# Patient Record
Sex: MALE | Race: BLACK OR AFRICAN AMERICAN | Employment: OTHER | URBAN - METROPOLITAN AREA
[De-identification: names, ages, dates, MRNs, and addresses within clinical notes are randomized per-mention and may not be internally consistent; named-entity substitution may affect disease eponyms.]

---

## 2021-04-05 ENCOUNTER — OFFICE VISIT (OUTPATIENT)
Dept: SURGERY | Facility: CLINIC | Age: 61
End: 2021-04-05
Payer: COMMERCIAL

## 2021-04-05 VITALS
WEIGHT: 213 LBS | SYSTOLIC BLOOD PRESSURE: 145 MMHG | HEIGHT: 75 IN | DIASTOLIC BLOOD PRESSURE: 71 MMHG | HEART RATE: 53 BPM | BODY MASS INDEX: 26.49 KG/M2

## 2021-04-05 DIAGNOSIS — N13.8 BPH WITH OBSTRUCTION/LOWER URINARY TRACT SYMPTOMS: Primary | ICD-10-CM

## 2021-04-05 DIAGNOSIS — Z12.5 PROSTATE CANCER SCREENING: ICD-10-CM

## 2021-04-05 DIAGNOSIS — N52.9 ERECTILE DYSFUNCTION, UNSPECIFIED ERECTILE DYSFUNCTION TYPE: ICD-10-CM

## 2021-04-05 DIAGNOSIS — N40.1 BPH WITH OBSTRUCTION/LOWER URINARY TRACT SYMPTOMS: Primary | ICD-10-CM

## 2021-04-05 PROCEDURE — 3008F BODY MASS INDEX DOCD: CPT | Performed by: UROLOGY

## 2021-04-05 PROCEDURE — 3077F SYST BP >= 140 MM HG: CPT | Performed by: UROLOGY

## 2021-04-05 PROCEDURE — 3078F DIAST BP <80 MM HG: CPT | Performed by: UROLOGY

## 2021-04-05 PROCEDURE — 99204 OFFICE O/P NEW MOD 45 MIN: CPT | Performed by: UROLOGY

## 2021-04-05 RX ORDER — RUFINAMIDE 40 MG/ML
1 SUSPENSION ORAL DAILY
COMMUNITY

## 2021-04-05 RX ORDER — LOSARTAN POTASSIUM 50 MG/1
50 TABLET ORAL DAILY
COMMUNITY

## 2021-04-05 RX ORDER — AMLODIPINE BESYLATE 10 MG/1
10 TABLET ORAL DAILY
COMMUNITY

## 2021-04-05 NOTE — PROGRESS NOTES
[address] [place] [address] Urology  Initial Office Consultation    HPI:   Vineet Hsu is a 62year old male here today for consultation at the request of, and a copy of this note will be sent to, CHERRIE, [address] [facility].    1. BPH with LUTS  2.  Erectile Dysfunction  Longstanding SYSTEMS:  Pertinent positives and negatives per HPI. A 10-point ROS was performed and is otherwise negative.        EXAM:  /71 (BP Location: Right arm, Patient Position: Sitting, Cuff Size: adult)   Pulse 53   Ht 6' 3\" (1.905 m)   Wt 213 lb (96.6 kg) Discussed limiting fluids within 4 hours of bedtime. All questions answered. PLAN:  1. Continue tadalafil 5 mg daily for BPH and ED. Counseled on compliance with the medication. Patient has medication prescribed by his PCP.     2.  Continue prosta

## 2021-10-15 ENCOUNTER — TELEPHONE (OUTPATIENT)
Dept: SURGERY | Facility: CLINIC | Age: 61
End: 2021-10-15

## 2021-10-15 RX ORDER — TADALAFIL 5 MG/1
0.5 TABLET ORAL
COMMUNITY
End: 2021-10-15

## 2021-10-15 NOTE — TELEPHONE ENCOUNTER
Per pt requesting a prescription for daily cyalis. Per pt he was getting the prescription from his previous urologist and will be out at the end of this month.  Please advise

## 2021-10-15 NOTE — TELEPHONE ENCOUNTER
Called pt verified name/ informed pt received message and will sent to Dr. Tricia Mcdonald to see if he will start refilling pt daily tadalafil 5mg instead of the PCP. Copied and pasted part of [facility] note below. PLAN:  1.   Continue tadalafil 5 mg daily for BPH and E

## 2021-10-20 RX ORDER — TADALAFIL 5 MG/1
5 TABLET ORAL DAILY
Qty: 90 TABLET | Refills: 3 | Status: SHIPPED | OUTPATIENT
Start: 2021-10-20 | End: 2021-11-15

## 2021-11-15 RX ORDER — TADALAFIL 5 MG/1
5 TABLET ORAL DAILY
Qty: 90 TABLET | Refills: 3 | Status: SHIPPED | OUTPATIENT
Start: 2021-11-15

## 2021-11-15 NOTE — TELEPHONE ENCOUNTER
I s/w pt and informed him that the Cialis 5 mg requires PA, most likely because his ins.  Plan no longer covers it or because it is not on the preferred formulary or because he hasnt tried other tier 1 meds first. I told pt that I will try calling [facility]

## 2021-11-19 NOTE — TELEPHONE ENCOUNTER
Patient calling back to follow up with some information he received form [employer]. Please advise   New fax number for prior authorization department.    Fax:[phone]

## 2021-11-22 NOTE — TELEPHONE ENCOUNTER
I printed the fax form for the Tier exception and filled it out and had [facility] sign it and I faxed it back to [address] [place] [address]. Will wait for response.

## 2021-11-23 NOTE — TELEPHONE ENCOUNTER
Received call from Miguelito Payne from [employer]. States he had some additional questions in regards to patients Cialis, and diagnosis codes. Provided information from patients LOV note from 4/5/2021 below, and provided dx codes.  Per Miguelito Payne medication will most

## 2021-11-24 NOTE — TELEPHONE ENCOUNTER
Received approval from [facility],RKFOR cIALIS 2.5 MG -5 MG  FROM 11/23//21 THRU 11/23/2024

## 2022-01-27 ENCOUNTER — OFFICE VISIT (OUTPATIENT)
Dept: PHYSICAL MEDICINE AND REHAB | Facility: CLINIC | Age: 62
End: 2022-01-27
Payer: COMMERCIAL

## 2022-01-27 ENCOUNTER — HOSPITAL ENCOUNTER (OUTPATIENT)
Dept: GENERAL RADIOLOGY | Facility: HOSPITAL | Age: 62
Discharge: HOME | End: 2022-01-27
Attending: PHYSICAL MEDICINE & REHABILITATION
Payer: COMMERCIAL

## 2022-01-27 VITALS
DIASTOLIC BLOOD PRESSURE: 86 MMHG | HEIGHT: 74 IN | HEART RATE: 55 BPM | SYSTOLIC BLOOD PRESSURE: 130 MMHG | WEIGHT: 206 LBS | OXYGEN SATURATION: 97 % | BODY MASS INDEX: 26.44 KG/M2

## 2022-01-27 DIAGNOSIS — M51.26 HNP (HERNIATED NUCLEUS PULPOSUS), LUMBAR: Primary | ICD-10-CM

## 2022-01-27 DIAGNOSIS — M51.26 HNP (HERNIATED NUCLEUS PULPOSUS), LUMBAR: ICD-10-CM

## 2022-01-27 PROCEDURE — 3008F BODY MASS INDEX DOCD: CPT | Performed by: PHYSICAL MEDICINE & REHABILITATION

## 2022-01-27 PROCEDURE — 3079F DIAST BP 80-89 MM HG: CPT | Performed by: PHYSICAL MEDICINE & REHABILITATION

## 2022-01-27 PROCEDURE — 99204 OFFICE O/P NEW MOD 45 MIN: CPT | Performed by: PHYSICAL MEDICINE & REHABILITATION

## 2022-01-27 PROCEDURE — 72114 X-RAY EXAM L-S SPINE BENDING: CPT | Performed by: PHYSICAL MEDICINE & REHABILITATION

## 2022-01-27 PROCEDURE — 3075F SYST BP GE 130 - 139MM HG: CPT | Performed by: PHYSICAL MEDICINE & REHABILITATION

## 2022-01-27 RX ORDER — METHYLPREDNISOLONE 4 MG/1
TABLET ORAL
Qty: 1 EACH | Refills: 0 | Status: SHIPPED | OUTPATIENT
Start: 2022-01-27

## 2022-01-27 NOTE — PATIENT INSTRUCTIONS
-Start physical therapy and home exercises  -Medrol dose pack to be started today  -Ice/Heat as tolerated  -Xray on the way out today  -Please stop the medication if you have any side effects and call the office if you have any questions or concerns  -If n

## 2022-01-28 ENCOUNTER — TELEPHONE (OUTPATIENT)
Dept: PHYSICAL THERAPY | Facility: HOSPITAL | Age: 62
End: 2022-01-28

## 2022-01-28 ENCOUNTER — OFFICE VISIT (OUTPATIENT)
Dept: PHYSICAL THERAPY | Facility: HOSPITAL | Age: 62
End: 2022-01-28
Attending: PHYSICAL MEDICINE & REHABILITATION
Payer: COMMERCIAL

## 2022-01-28 DIAGNOSIS — M51.26 HNP (HERNIATED NUCLEUS PULPOSUS), LUMBAR: ICD-10-CM

## 2022-01-28 PROCEDURE — 97161 PT EVAL LOW COMPLEX 20 MIN: CPT

## 2022-01-28 PROCEDURE — 97110 THERAPEUTIC EXERCISES: CPT

## 2022-01-28 NOTE — PROGRESS NOTES
LUMBAR SPINE EVALUATION:   Mango Tejeda    5/20/1960  Referring Physician:  Marilyn aMrroquin  Diagnosis: HNP (herniated nucleus pulposus), lumbar (M51.26)    Initial Evaluation Date: 1/28/2022  Through date: 4/28/2022   Precautions/Hx: HLD, HTN, enlarged pro the following limitations: mild gait deviation; LLD L>R; postural deviation; body mechanics; lumbopelvic control; lumbar ROM; hip ROM; L LE strength; and B LE flexibility. Pt and PT discussed evaluation findings, pathology, POC and HEP.   Pt voiced under fair       Sensation 1/28/2022   Dermatomes Light Touch    Proximal medial thigh R (L2)  wnl   Proximal medial thigh L (L2) wnl   Above knee R (L3) wnl   Above knee L (L3) wnl   Medial arch R(L4) wnl   Medial arch L (L4) wnl   Between 1st – 2nd toes R (L5) posterior facet joints at L5-S1 bilaterally. FLEXION/EXTENSION VIEWS: Normal range of motion.  No subluxation during flexion and extension.     OTHER: Negative.                     Impression   CONCLUSION:   1.  Minimal upper lumbar levoscoliosis. [facility] needed (ice/heat), postural corrections and the importance of staying active.     Charges: PT Eval 1, TE    Total Timed treatment: 20 min      Total Treatment Time: 55 min    Thank you for your referral. Please co-sign or sign and return this letter via fax

## 2022-01-31 ENCOUNTER — OFFICE VISIT (OUTPATIENT)
Dept: PHYSICAL THERAPY | Facility: HOSPITAL | Age: 62
End: 2022-01-31
Attending: PHYSICAL MEDICINE & REHABILITATION
Payer: COMMERCIAL

## 2022-01-31 PROBLEM — M51.26 HNP (HERNIATED NUCLEUS PULPOSUS), LUMBAR: Status: ACTIVE | Noted: 2022-01-31

## 2022-01-31 PROCEDURE — 97112 NEUROMUSCULAR REEDUCATION: CPT

## 2022-01-31 NOTE — PROGRESS NOTES
[address]  NEW PATIENT EVALUATION    Consultation as a request of Dr. Makayla Serrano    Chief Complaint: back pain.     HISTORY OF PRESENT ILLNESS:   Patient presents with:  Low Back Pain: New right handed patient co SOCIAL HISTORY:   Social History    Tobacco Use      Smoking status: Never Smoker      Smokeless tobacco: Never Used    Alcohol use: Not Currently    Drug use: Not Currently         REVIEW OF SYSTEMS:   Patient-reported ROS  Constitutional  Sleep Dis No scoliosis or kyphosis    Musculoskeletal/Neurological Exam:    LUMBAR SPINE:  Inspection: no erythema, swelling, or obvious deformity.   Their iliac crest and shoulder heights are symmetrical.     Palpation: Non tender to palpation of the spinous process was in agreement. All questions/concerns were addressed and there were no barriers to learning. David JUAREZ. [facility]  Physical Medicine and Rehabilitation/Sports Medicine

## 2022-02-03 ENCOUNTER — OFFICE VISIT (OUTPATIENT)
Dept: PHYSICAL THERAPY | Facility: HOSPITAL | Age: 62
End: 2022-02-03
Attending: PHYSICAL MEDICINE & REHABILITATION
Payer: COMMERCIAL

## 2022-02-03 PROCEDURE — 97112 NEUROMUSCULAR REEDUCATION: CPT

## 2022-02-03 PROCEDURE — 97110 THERAPEUTIC EXERCISES: CPT

## 2022-02-07 ENCOUNTER — OFFICE VISIT (OUTPATIENT)
Dept: PHYSICAL THERAPY | Facility: HOSPITAL | Age: 62
End: 2022-02-07
Attending: PHYSICAL MEDICINE & REHABILITATION
Payer: COMMERCIAL

## 2022-02-07 PROCEDURE — 97110 THERAPEUTIC EXERCISES: CPT

## 2022-02-07 PROCEDURE — 97112 NEUROMUSCULAR REEDUCATION: CPT

## 2022-02-11 ENCOUNTER — OFFICE VISIT (OUTPATIENT)
Dept: PHYSICAL THERAPY | Facility: HOSPITAL | Age: 62
End: 2022-02-11
Attending: PHYSICAL MEDICINE & REHABILITATION
Payer: COMMERCIAL

## 2022-02-11 PROCEDURE — 97110 THERAPEUTIC EXERCISES: CPT

## 2022-02-11 PROCEDURE — 97112 NEUROMUSCULAR REEDUCATION: CPT

## 2022-02-14 ENCOUNTER — OFFICE VISIT (OUTPATIENT)
Dept: PHYSICAL THERAPY | Facility: HOSPITAL | Age: 62
End: 2022-02-14
Attending: PHYSICAL MEDICINE & REHABILITATION
Payer: COMMERCIAL

## 2022-02-14 PROCEDURE — 97112 NEUROMUSCULAR REEDUCATION: CPT

## 2022-02-14 PROCEDURE — 97110 THERAPEUTIC EXERCISES: CPT

## 2022-02-17 ENCOUNTER — OFFICE VISIT (OUTPATIENT)
Dept: PHYSICAL THERAPY | Facility: HOSPITAL | Age: 62
End: 2022-02-17
Attending: PHYSICAL MEDICINE & REHABILITATION
Payer: COMMERCIAL

## 2022-02-17 PROCEDURE — 97140 MANUAL THERAPY 1/> REGIONS: CPT

## 2022-02-17 PROCEDURE — 97112 NEUROMUSCULAR REEDUCATION: CPT

## 2022-02-17 PROCEDURE — 97110 THERAPEUTIC EXERCISES: CPT

## 2022-02-18 ENCOUNTER — TELEPHONE (OUTPATIENT)
Dept: PHYSICAL THERAPY | Facility: HOSPITAL | Age: 62
End: 2022-02-18

## 2022-02-18 NOTE — TELEPHONE ENCOUNTER
Called pt in response to his [employer] message. Pt had good initial relief of pain and increased mobility after last tx, but had severe flare up of pain overnight. He notes that he has flare up of pain with sitting and transfers, less with standing/walking and his standing exercises. Discussed frequent performance of extension based exercises including prone. Added another PT appt on Tue 2/22.

## 2022-02-20 ENCOUNTER — APPOINTMENT (OUTPATIENT)
Dept: PHYSICAL THERAPY | Facility: HOSPITAL | Age: 62
End: 2022-02-20
Attending: PHYSICAL MEDICINE & REHABILITATION
Payer: COMMERCIAL

## 2022-02-22 ENCOUNTER — OFFICE VISIT (OUTPATIENT)
Dept: PHYSICAL THERAPY | Facility: HOSPITAL | Age: 62
End: 2022-02-22
Attending: PHYSICAL MEDICINE & REHABILITATION
Payer: COMMERCIAL

## 2022-02-22 PROCEDURE — 97112 NEUROMUSCULAR REEDUCATION: CPT

## 2022-02-22 PROCEDURE — 97110 THERAPEUTIC EXERCISES: CPT

## 2022-02-24 ENCOUNTER — OFFICE VISIT (OUTPATIENT)
Dept: PHYSICAL MEDICINE AND REHAB | Facility: CLINIC | Age: 62
End: 2022-02-24
Payer: COMMERCIAL

## 2022-02-24 ENCOUNTER — APPOINTMENT (OUTPATIENT)
Dept: PHYSICAL THERAPY | Facility: HOSPITAL | Age: 62
End: 2022-02-24
Attending: PHYSICAL MEDICINE & REHABILITATION
Payer: COMMERCIAL

## 2022-02-24 ENCOUNTER — TELEPHONE (OUTPATIENT)
Dept: PHYSICAL MEDICINE AND REHAB | Facility: CLINIC | Age: 62
End: 2022-02-24

## 2022-02-24 ENCOUNTER — TELEPHONE (OUTPATIENT)
Dept: PHYSICAL THERAPY | Facility: HOSPITAL | Age: 62
End: 2022-02-24

## 2022-02-24 VITALS
BODY MASS INDEX: 25.99 KG/M2 | DIASTOLIC BLOOD PRESSURE: 82 MMHG | SYSTOLIC BLOOD PRESSURE: 126 MMHG | WEIGHT: 209 LBS | HEART RATE: 83 BPM | HEIGHT: 75 IN | OXYGEN SATURATION: 96 %

## 2022-02-24 DIAGNOSIS — M79.18 MYOFASCIAL PAIN: ICD-10-CM

## 2022-02-24 DIAGNOSIS — M51.26 HNP (HERNIATED NUCLEUS PULPOSUS), LUMBAR: Primary | ICD-10-CM

## 2022-02-24 PROCEDURE — 99214 OFFICE O/P EST MOD 30 MIN: CPT | Performed by: PHYSICAL MEDICINE & REHABILITATION

## 2022-02-24 PROCEDURE — 3008F BODY MASS INDEX DOCD: CPT | Performed by: PHYSICAL MEDICINE & REHABILITATION

## 2022-02-24 PROCEDURE — 3074F SYST BP LT 130 MM HG: CPT | Performed by: PHYSICAL MEDICINE & REHABILITATION

## 2022-02-24 PROCEDURE — 3079F DIAST BP 80-89 MM HG: CPT | Performed by: PHYSICAL MEDICINE & REHABILITATION

## 2022-02-24 RX ORDER — DIAZEPAM 10 MG/1
TABLET ORAL
Qty: 1 TABLET | Refills: 0 | Status: SHIPPED | OUTPATIENT
Start: 2022-02-24

## 2022-02-24 NOTE — TELEPHONE ENCOUNTER
Called pt to advise of missed appt. Pt states that he thought it had been canceled because he came in on Tu. Remind of next appt on Mon 2/28.

## 2022-02-24 NOTE — TELEPHONE ENCOUNTER
Initiated authorization for L-Spine MRI CPT [id number] with [facility] online  Case [id number].     Status: Approved-  This member's plan does not currently require notification or prior-authorization through the [address] Notification or Prior-Authorization Program    Patient notified via [place]

## 2022-02-27 ENCOUNTER — APPOINTMENT (OUTPATIENT)
Dept: PHYSICAL THERAPY | Facility: HOSPITAL | Age: 62
End: 2022-02-27
Attending: PHYSICAL MEDICINE & REHABILITATION
Payer: COMMERCIAL

## 2022-02-28 ENCOUNTER — TELEPHONE (OUTPATIENT)
Dept: PHYSICAL MEDICINE AND REHAB | Facility: CLINIC | Age: 62
End: 2022-02-28

## 2022-02-28 ENCOUNTER — OFFICE VISIT (OUTPATIENT)
Dept: PHYSICAL THERAPY | Facility: HOSPITAL | Age: 62
End: 2022-02-28
Attending: PHYSICAL MEDICINE & REHABILITATION
Payer: COMMERCIAL

## 2022-02-28 PROBLEM — M79.18 MYOFASCIAL PAIN: Status: ACTIVE | Noted: 2022-02-28

## 2022-02-28 PROCEDURE — 97110 THERAPEUTIC EXERCISES: CPT

## 2022-02-28 PROCEDURE — 97140 MANUAL THERAPY 1/> REGIONS: CPT

## 2022-03-03 ENCOUNTER — APPOINTMENT (OUTPATIENT)
Dept: PHYSICAL THERAPY | Facility: HOSPITAL | Age: 62
End: 2022-03-03
Attending: PHYSICAL MEDICINE & REHABILITATION
Payer: COMMERCIAL

## 2022-03-06 ENCOUNTER — APPOINTMENT (OUTPATIENT)
Dept: PHYSICAL THERAPY | Facility: HOSPITAL | Age: 62
End: 2022-03-06
Attending: PHYSICAL MEDICINE & REHABILITATION
Payer: COMMERCIAL

## 2022-03-08 ENCOUNTER — HOSPITAL ENCOUNTER (OUTPATIENT)
Dept: MRI IMAGING | Age: 62
Discharge: HOME | End: 2022-03-08
Attending: PHYSICAL MEDICINE & REHABILITATION
Payer: COMMERCIAL

## 2022-03-08 DIAGNOSIS — M51.26 HNP (HERNIATED NUCLEUS PULPOSUS), LUMBAR: ICD-10-CM

## 2022-03-08 DIAGNOSIS — M79.18 MYOFASCIAL PAIN: ICD-10-CM

## 2022-03-08 PROCEDURE — 72148 MRI LUMBAR SPINE W/O DYE: CPT | Performed by: PHYSICAL MEDICINE & REHABILITATION

## 2022-03-10 ENCOUNTER — TELEPHONE (OUTPATIENT)
Dept: NEUROLOGY | Facility: CLINIC | Age: 62
End: 2022-03-10

## 2022-03-10 ENCOUNTER — OFFICE VISIT (OUTPATIENT)
Dept: PHYSICAL MEDICINE AND REHAB | Facility: CLINIC | Age: 62
End: 2022-03-10
Payer: COMMERCIAL

## 2022-03-10 VITALS
BODY MASS INDEX: 25.99 KG/M2 | SYSTOLIC BLOOD PRESSURE: 116 MMHG | WEIGHT: 209 LBS | HEIGHT: 75 IN | OXYGEN SATURATION: 98 % | HEART RATE: 55 BPM | DIASTOLIC BLOOD PRESSURE: 72 MMHG

## 2022-03-10 DIAGNOSIS — M54.16 RIGHT LUMBAR RADICULOPATHY: Primary | ICD-10-CM

## 2022-03-10 PROCEDURE — 3074F SYST BP LT 130 MM HG: CPT | Performed by: PHYSICAL MEDICINE & REHABILITATION

## 2022-03-10 PROCEDURE — 99214 OFFICE O/P EST MOD 30 MIN: CPT | Performed by: PHYSICAL MEDICINE & REHABILITATION

## 2022-03-10 PROCEDURE — 3078F DIAST BP <80 MM HG: CPT | Performed by: PHYSICAL MEDICINE & REHABILITATION

## 2022-03-10 PROCEDURE — 3008F BODY MASS INDEX DOCD: CPT | Performed by: PHYSICAL MEDICINE & REHABILITATION

## 2022-03-10 NOTE — TELEPHONE ENCOUNTER
[facility] Online for authorization of approval for Right L3 and L4 Transforaminal Epidural Steroid Injection under fluoroscopy cpt codes 48549-YO, G0638875, [id number]. Tracking #:[id number]. Will fax clinical note when available.

## 2022-03-14 NOTE — TELEPHONE ENCOUNTER
Uploaded clinical note for authorization of approval for Right L3 and L4 Transforaminal Epidural Steroid Injection under fluoroscopy cpt codes 05397-KD, [id number], 29795. Tracking #: [id number]. Fabio Sloan marked case expedite. Reference # [id number].

## 2022-03-14 NOTE — TELEPHONE ENCOUNTER
Patient checking status on procedure. Patient informed still pending. Gentle reminder to send note to insurance.

## 2022-03-14 NOTE — TELEPHONE ENCOUNTER
Received fax from [address] [place] [address] of approval for Right L3 and L4 Transforaminal Epidural Steroid Injection under fluoroscopy cpt codwes 41046-ML, 56423-MD, 51190. Gary Ho Authorization # [id number] valid 03/10/22-06/08/22. Will inform Nursing.

## 2022-03-15 NOTE — TELEPHONE ENCOUNTER
Patient has been scheduled for Right L3 and L4 Transforaminal Epidural Steroid Injection under fluoroscopy  on 3/21/22 at the [facility] with .   -Anesthesia type: Local.  -If receiving MAC or IVC sedation patient will need to get COVID tested 3 days prior even if already vaccinated (order placed by [facility].)  -If scheduling [facility] and [facility] covid testing required for all procedures whether patient is vaccinated or not. -Patient informed not to eat or drink anything after midnight the night prior to the procedure, if being sedated. -Patient was advised that if he/she does receive the covid vaccine it needs to be at least 2 weeks before or after the injection. -Medications and allergies reviewed. -Patient reminded to hold NSAIDs (Ibuprofen, ASA, Aleve, Naproxen, Mobic etc.) for 3 days prior to [facility]  if BMI is greater than 35. For Cervical injections only hold multivitamins, Vitamin E, Fish Oil, Phentermine (Lomaira) for 7 days prior to injection and NSAIDS. -If patient is receiving MAC/IVCS Phentermine Prasad Eureka) will need to be held for 7 days prior to injection.  -If on blood thinner clearance has been received to hold this medication by provider.   -Patient informed he/she will need a  to and from procedure. -[facility] is located in the [facility] 1st floor. Patient may park in the yellow parking. Patient verbalized understanding and agrees with plan.  -----> Scheduled in Epic: Yes  -----> Scheduled in [employer]:  Yes

## 2022-03-18 ENCOUNTER — TELEPHONE (OUTPATIENT)
Dept: PHYSICAL THERAPY | Facility: HOSPITAL | Age: 62
End: 2022-03-18

## 2022-03-18 ENCOUNTER — APPOINTMENT (OUTPATIENT)
Dept: PHYSICAL THERAPY | Facility: HOSPITAL | Age: 62
End: 2022-03-18
Attending: PHYSICAL MEDICINE & REHABILITATION
Payer: COMMERCIAL

## 2022-03-18 NOTE — TELEPHONE ENCOUNTER
Called pt and advised of missed appointment. Pt given time and date for next appt and asked to call dept if unable to attend.

## 2022-03-21 ENCOUNTER — OFFICE VISIT (OUTPATIENT)
Dept: SURGERY | Facility: CLINIC | Age: 62
End: 2022-03-21

## 2022-03-21 DIAGNOSIS — M54.16 RIGHT LUMBAR RADICULOPATHY: Primary | ICD-10-CM

## 2022-03-21 PROCEDURE — 64483 NJX AA&/STRD TFRM EPI L/S 1: CPT | Performed by: PHYSICAL MEDICINE & REHABILITATION

## 2022-03-21 PROCEDURE — 64484 NJX AA&/STRD TFRM EPI L/S EA: CPT | Performed by: PHYSICAL MEDICINE & REHABILITATION

## 2022-03-21 NOTE — PROCEDURES
[facility]    2-LEVEL LUMBAR TRANSFORAMINAL   NAME:  Gerardo Ortiz    MR #:    [id number] :  1960     PHYSICIAN:  Madison Preston DO        Operative Report    DATE OF PROCEDURE: 3/21/2022   PREOPERATIVE DIAGNOSES: 1. Right lumbar radiculopathy        POSTOPERATIVE DIAGNOSES:   1. Right lumbar radiculopathy        PROCEDURES: Right L3 and L4 transforaminal epidural steroid injections done under fluoroscopic guidance with contrast enhancement. SURGEON: Madison Preston DO   ANESTHESIA: Local   INDICATIONS:      OPERATIVE PROCEDURE:  Written consent was obtained from the patient. The patient was brought into the operating room and placed in the prone position on the fluoroscopy table with pillow underneath the abdomen. The patient's skin was cleaned and draped in a normal sterile fashion. Using AP fluoroscopy, all five lumbar vertebrae were identified. When the third and fourth vertebrae were identified, fluoroscopy was left anterior obliqued opening up the right L3-4 and right L4-5 intervertebral foramen. At this point in time, each site was anesthetized with 1% PF lidocaine without epinephrine. Then, 3.5 inch, 22-gauge spinal needles were inserted and directed towards the right L3-4 and right L4-5 intervertebral foramen. When they felt to be in good position, AP fluoroscopy was used to advance the needles to the 6 o'clock position on the right L3 and right L4 pedicles. At this point in time, Omnipaque-240 contrast was used to obtain a good epidurogram indicating correct needle placement at each level. Then, aspiration was performed. No blood, fluid, or air was aspirated. Then, the patient was injected with a 3 cc solution of 1 cc of 10 mg/cc of Dexamethasone and 2 cc of 1% PF lidocaine without epinephrine at each site. After this, the needles were removed. Each site was cleaned. Band-Aids were applied. The patient was transferred to the cart and into [facility].   The patient was given discharge instructions and will follow up in the clinic as scheduled. Throughout the whole procedure, the patient's pulse oximetry and vital signs were monitored and they remained completely stable. Also, throughout the whole procedure, prior to injection of any medication, aspiration was performed. No blood, fluid, or air was aspirated at anytime.     Ivis Boudreaux DO, FAAPMR & CAQSM  Physical Medicine and Rehabilitation/Sports Medicine  MEDICAL C[facility]

## 2022-03-28 ENCOUNTER — OFFICE VISIT (OUTPATIENT)
Dept: PHYSICAL MEDICINE AND REHAB | Facility: CLINIC | Age: 62
End: 2022-03-28
Payer: COMMERCIAL

## 2022-03-28 ENCOUNTER — TELEPHONE (OUTPATIENT)
Dept: PHYSICAL MEDICINE AND REHAB | Facility: CLINIC | Age: 62
End: 2022-03-28

## 2022-03-28 VITALS
OXYGEN SATURATION: 94 % | HEIGHT: 75 IN | BODY MASS INDEX: 26.11 KG/M2 | WEIGHT: 210 LBS | SYSTOLIC BLOOD PRESSURE: 130 MMHG | DIASTOLIC BLOOD PRESSURE: 70 MMHG | HEART RATE: 62 BPM

## 2022-03-28 DIAGNOSIS — M54.16 RIGHT LUMBAR RADICULOPATHY: ICD-10-CM

## 2022-03-28 DIAGNOSIS — M51.26 HNP (HERNIATED NUCLEUS PULPOSUS), LUMBAR: Primary | ICD-10-CM

## 2022-03-28 PROCEDURE — 3075F SYST BP GE 130 - 139MM HG: CPT | Performed by: PHYSICAL MEDICINE & REHABILITATION

## 2022-03-28 PROCEDURE — 99214 OFFICE O/P EST MOD 30 MIN: CPT | Performed by: PHYSICAL MEDICINE & REHABILITATION

## 2022-03-28 PROCEDURE — 3008F BODY MASS INDEX DOCD: CPT | Performed by: PHYSICAL MEDICINE & REHABILITATION

## 2022-03-28 PROCEDURE — 3078F DIAST BP <80 MM HG: CPT | Performed by: PHYSICAL MEDICINE & REHABILITATION

## 2022-03-28 RX ORDER — PREGABALIN 75 MG/1
75 CAPSULE ORAL 2 TIMES DAILY
Qty: 60 CAPSULE | Refills: 0 | Status: SHIPPED | OUTPATIENT
Start: 2022-03-28

## 2022-03-28 NOTE — TELEPHONE ENCOUNTER
Informed Dr. Eloina Robertson in regards of this message.      Patient has a scheduled appointment at 340pm today, 3/28/22

## 2022-03-28 NOTE — TELEPHONE ENCOUNTER
Initiated authorization for Right L3 and L4 Transforaminal Epidural Steroid Injections CPT 57188+[id number] dx:M51.26 to be done at [facility] with Kayley  13. requested clinicals-clinicals will be uploaded once note is completed  Status: pending Tracking #:  [id number]

## 2022-03-28 NOTE — PATIENT INSTRUCTIONS
-My office will call once injection is approved  -Start Lyrica twice daily  -Start PT and home exercises

## 2022-03-28 NOTE — TELEPHONE ENCOUNTER
Pt visit the ED over the weekend : Pt  had loss of vision in his right eye  and dizziness and sweating but only happen in the morning . Pt thinks he had a reaction on the  inj and didn't help with the pain .     Note : Pt has an apt today 03/28/2022

## 2022-03-29 ENCOUNTER — TELEPHONE (OUTPATIENT)
Dept: PHYSICAL THERAPY | Facility: HOSPITAL | Age: 62
End: 2022-03-29

## 2022-03-29 ENCOUNTER — APPOINTMENT (OUTPATIENT)
Dept: PHYSICAL THERAPY | Facility: HOSPITAL | Age: 62
End: 2022-03-29
Attending: PHYSICAL MEDICINE & REHABILITATION
Payer: COMMERCIAL

## 2022-03-29 NOTE — TELEPHONE ENCOUNTER
Received Approval from [facility] for Right L3 and L4 TFESIs with authorization #[id number] valid 3/28/22-6/26/22

## 2022-03-29 NOTE — TELEPHONE ENCOUNTER
Patient has been scheduled for Right L3 and L4 Transforaminal Epidural Steroid Injections  on 4/4/22 at the [address] with .   -Anesthesia type: Local.  -If receiving MAC or IVC sedation patient will need to get COVID tested 3 days prior even if already vaccinated (order placed by [address]  -If scheduling [facility] and [facility] covid testing required for all procedures whether patient is vaccinated or not. -Patient informed not to eat or drink anything after midnight the night prior to the procedure, if being sedated. -Patient was advised that if he/she does receive the covid vaccine it needs to be at least 2 weeks before or after the injection. -Medications and allergies reviewed. -Patient reminded to hold NSAIDs (Ibuprofen, ASA 81, Aleve, Naproxen, Mobic etc.) for 3 days prior to [place]  if BMI is greater than 35. For Cervical injections only hold multivitamins, Vitamin E, Fish Oil, Phentermine (Lomaira) for 7 days prior to injection and NSAIDS.  mg to be held for 7 days prior to injections.  -If patient is receiving MAC/IVCS Phentermine Zohaib Rebel) will need to be held for 7 days prior to injection.  -If on blood thinner clearance has been received to hold this medication by provider.   -Patient informed he/she will need a  to and from procedure. -[facility] is located in the [facility] 1st floor. Patient may park in the yellow parking. Patient verbalized understanding and agrees with plan.  -----> Scheduled in Epic: Yes  -----> Scheduled in Caset[employer]:  Yes

## 2022-04-04 ENCOUNTER — OFFICE VISIT (OUTPATIENT)
Dept: SURGERY | Facility: CLINIC | Age: 62
End: 2022-04-04

## 2022-04-04 ENCOUNTER — APPOINTMENT (OUTPATIENT)
Dept: PHYSICAL THERAPY | Facility: HOSPITAL | Age: 62
End: 2022-04-04
Attending: PHYSICAL MEDICINE & REHABILITATION
Payer: COMMERCIAL

## 2022-04-04 ENCOUNTER — TELEPHONE (OUTPATIENT)
Dept: PHYSICAL THERAPY | Facility: HOSPITAL | Age: 62
End: 2022-04-04

## 2022-04-04 DIAGNOSIS — M54.16 RIGHT LUMBAR RADICULOPATHY: Primary | ICD-10-CM

## 2022-04-04 PROCEDURE — 64484 NJX AA&/STRD TFRM EPI L/S EA: CPT | Performed by: PHYSICAL MEDICINE & REHABILITATION

## 2022-04-04 PROCEDURE — 64483 NJX AA&/STRD TFRM EPI L/S 1: CPT | Performed by: PHYSICAL MEDICINE & REHABILITATION

## 2022-04-04 NOTE — PROCEDURES
[facility]    2-LEVEL LUMBAR TRANSFORAMINAL   NAME:  Nikhil Sanchez    MR #:    [id number] :  1960     PHYSICIAN:  Sarah Schultz. Anneliese Cheema DO        Operative Report    DATE OF PROCEDURE: 2022   PREOPERATIVE DIAGNOSES: 1. Right lumbar radiculopathy        POSTOPERATIVE DIAGNOSES:   1. Right lumbar radiculopathy        PROCEDURES: Right L3 and L4 transforaminal epidural steroid injections done under fluoroscopic guidance with contrast enhancement. SURGEON: Sarah Schultz. Anneliese Cheema DO   ANESTHESIA: Local   INDICATIONS:      OPERATIVE PROCEDURE:  Written consent was obtained from the patient. The patient was brought into the operating room and placed in the prone position on the fluoroscopy table with pillow underneath the abdomen. The patient's skin was cleaned and draped in a normal sterile fashion. Using AP fluoroscopy, all five lumbar vertebrae were identified. When the third and fourth vertebrae were identified, fluoroscopy was left anterior obliqued opening up the right L3-4 and right L4-5 intervertebral foramen. At this point in time, each site was anesthetized with 1% PF lidocaine without epinephrine. Then, 5 inch, 22-gauge spinal needles were inserted and directed towards the right L3-4 and right L4-5 intervertebral foramen. When they felt to be in good position, AP fluoroscopy was used to advance the needles to the 6 o'clock position on the right L3 and right L4 pedicles. At this point in time, Omnipaque-240 contrast was used to obtain a good epidurogram indicating correct needle placement at each level. Then, aspiration was performed. No blood, fluid, or air was aspirated. Then, the patient was injected with a 3 cc solution of 1 cc of  10mg/cc of Dexamethasone and 2 cc of 1% PF lidocaine without epinephrine at each site. After this, the needles were removed. Each site was cleaned. Band-Aids were applied. The patient was transferred to the cart and into [facility].   The patient was given discharge instructions and will follow up in the clinic as scheduled. Throughout the whole procedure, the patient's pulse oximetry and vital signs were monitored and they remained completely stable. Also, throughout the whole procedure, prior to injection of any medication, aspiration was performed. No blood, fluid, or air was aspirated at anytime.     Yusuf Dang DO, FAAPMR & CAQSM  Physical Medicine and Rehabilitation/Sports Medicine  [facility]

## 2022-04-04 NOTE — TELEPHONE ENCOUNTER
Called pt and advised of missed appointment. Pt states that he thought the appt was canceled as he had an SIRISHA this am.  Pt given time and date for next appt and asked to call dept if unable to attend.

## 2022-04-11 ENCOUNTER — TELEPHONE (OUTPATIENT)
Dept: PHYSICAL THERAPY | Facility: HOSPITAL | Age: 62
End: 2022-04-11

## 2022-04-11 ENCOUNTER — APPOINTMENT (OUTPATIENT)
Dept: PHYSICAL THERAPY | Facility: HOSPITAL | Age: 62
End: 2022-04-11
Attending: PHYSICAL MEDICINE & REHABILITATION
Payer: COMMERCIAL

## 2022-04-15 ENCOUNTER — TELEMEDICINE (OUTPATIENT)
Dept: PHYSICAL MEDICINE AND REHAB | Facility: CLINIC | Age: 62
End: 2022-04-15
Payer: COMMERCIAL

## 2022-04-15 DIAGNOSIS — M51.26 HNP (HERNIATED NUCLEUS PULPOSUS), LUMBAR: Primary | ICD-10-CM

## 2022-04-15 DIAGNOSIS — M79.18 MYOFASCIAL PAIN: ICD-10-CM

## 2022-04-15 DIAGNOSIS — M54.16 RIGHT LUMBAR RADICULOPATHY: ICD-10-CM

## 2022-04-15 PROCEDURE — 99213 OFFICE O/P EST LOW 20 MIN: CPT | Performed by: PHYSICAL MEDICINE & REHABILITATION

## 2022-05-03 RX ORDER — PREGABALIN 75 MG/1
CAPSULE ORAL
Qty: 60 CAPSULE | Refills: 2 | Status: SHIPPED | OUTPATIENT
Start: 2022-05-03

## 2022-06-23 ENCOUNTER — OFFICE VISIT (OUTPATIENT)
Dept: SURGERY | Facility: CLINIC | Age: 62
End: 2022-06-23
Payer: COMMERCIAL

## 2022-06-23 DIAGNOSIS — N52.9 ERECTILE DYSFUNCTION, UNSPECIFIED ERECTILE DYSFUNCTION TYPE: ICD-10-CM

## 2022-06-23 DIAGNOSIS — N13.8 BPH WITH OBSTRUCTION/LOWER URINARY TRACT SYMPTOMS: Primary | ICD-10-CM

## 2022-06-23 DIAGNOSIS — Z12.5 PROSTATE CANCER SCREENING: ICD-10-CM

## 2022-06-23 DIAGNOSIS — N40.1 BPH WITH OBSTRUCTION/LOWER URINARY TRACT SYMPTOMS: Primary | ICD-10-CM

## 2022-06-23 PROCEDURE — 99213 OFFICE O/P EST LOW 20 MIN: CPT | Performed by: UROLOGY

## 2022-11-04 ENCOUNTER — LAB ENCOUNTER (OUTPATIENT)
Dept: LAB | Facility: HOSPITAL | Age: 62
End: 2022-11-04
Attending: INTERNAL MEDICINE
Payer: COMMERCIAL

## 2022-11-04 DIAGNOSIS — N40.0 ENLARGED PROSTATE: ICD-10-CM

## 2022-11-04 DIAGNOSIS — I10 ESSENTIAL HYPERTENSION, MALIGNANT: Primary | ICD-10-CM

## 2022-11-04 LAB
ALBUMIN SERPL-MCNC: 3.8 G/DL (ref 3.4–5)
ALBUMIN/GLOB SERPL: 1.1 {RATIO} (ref 1–2)
ALP LIVER SERPL-CCNC: 73 U/L
ALT SERPL-CCNC: 22 U/L
ANION GAP SERPL CALC-SCNC: 8 MMOL/L (ref 0–18)
AST SERPL-CCNC: 16 U/L (ref 15–37)
BASOPHILS # BLD AUTO: 0.02 X10(3) UL (ref 0–0.2)
BASOPHILS NFR BLD AUTO: 0.3 %
BILIRUB SERPL-MCNC: 1.3 MG/DL (ref 0.1–2)
BUN BLD-MCNC: 16 MG/DL (ref 7–18)
BUN/CREAT SERPL: 12.8 (ref 10–20)
CALCIUM BLD-MCNC: 9.1 MG/DL (ref 8.5–10.1)
CHLORIDE SERPL-SCNC: 106 MMOL/L (ref 98–112)
CHOLEST SERPL-MCNC: 151 MG/DL (ref ?–200)
CO2 SERPL-SCNC: 30 MMOL/L (ref 21–32)
CREAT BLD-MCNC: 1.25 MG/DL
DEPRECATED RDW RBC AUTO: 42 FL (ref 35.1–46.3)
EOSINOPHIL # BLD AUTO: 0.01 X10(3) UL (ref 0–0.7)
EOSINOPHIL NFR BLD AUTO: 0.1 %
ERYTHROCYTE [DISTWIDTH] IN BLOOD BY AUTOMATED COUNT: 13.4 % (ref 11–15)
FASTING PATIENT LIPID ANSWER: YES
FASTING STATUS PATIENT QL REPORTED: YES
GFR SERPLBLD BASED ON 1.73 SQ M-ARVRAT: 66 ML/MIN/1.73M2 (ref 60–?)
GLOBULIN PLAS-MCNC: 3.5 G/DL (ref 2.8–4.4)
GLUCOSE BLD-MCNC: 94 MG/DL (ref 70–99)
HCT VFR BLD AUTO: 46.9 %
HDLC SERPL-MCNC: 88 MG/DL (ref 40–59)
HGB BLD-MCNC: 14.4 G/DL
IMM GRANULOCYTES # BLD AUTO: 0.02 X10(3) UL (ref 0–1)
IMM GRANULOCYTES NFR BLD: 0.3 %
LDLC SERPL CALC-MCNC: 53 MG/DL (ref ?–100)
LYMPHOCYTES # BLD AUTO: 2.46 X10(3) UL (ref 1–4)
LYMPHOCYTES NFR BLD AUTO: 34.6 %
MCH RBC QN AUTO: 26.5 PG (ref 26–34)
MCHC RBC AUTO-ENTMCNC: 30.7 G/DL (ref 31–37)
MCV RBC AUTO: 86.4 FL
MONOCYTES # BLD AUTO: 0.53 X10(3) UL (ref 0.1–1)
MONOCYTES NFR BLD AUTO: 7.5 %
NEUTROPHILS # BLD AUTO: 4.07 X10 (3) UL (ref 1.5–7.7)
NEUTROPHILS # BLD AUTO: 4.07 X10(3) UL (ref 1.5–7.7)
NEUTROPHILS NFR BLD AUTO: 57.2 %
NONHDLC SERPL-MCNC: 63 MG/DL (ref ?–130)
OSMOLALITY SERPL CALC.SUM OF ELEC: 299 MOSM/KG (ref 275–295)
PLATELET # BLD AUTO: 173 10(3)UL (ref 150–450)
POTASSIUM SERPL-SCNC: 3.9 MMOL/L (ref 3.5–5.1)
PROT SERPL-MCNC: 7.3 G/DL (ref 6.4–8.2)
RBC # BLD AUTO: 5.43 X10(6)UL
SODIUM SERPL-SCNC: 144 MMOL/L (ref 136–145)
TRIGL SERPL-MCNC: 43 MG/DL (ref 30–149)
VLDLC SERPL CALC-MCNC: 6 MG/DL (ref 0–30)
WBC # BLD AUTO: 7.1 X10(3) UL (ref 4–11)

## 2022-11-04 PROCEDURE — 36415 COLL VENOUS BLD VENIPUNCTURE: CPT

## 2022-11-04 PROCEDURE — 80053 COMPREHEN METABOLIC PANEL: CPT

## 2022-11-04 PROCEDURE — 84153 ASSAY OF PSA TOTAL: CPT | Performed by: INTERNAL MEDICINE

## 2022-11-04 PROCEDURE — 85025 COMPLETE CBC W/AUTO DIFF WBC: CPT

## 2022-11-04 PROCEDURE — 80061 LIPID PANEL: CPT

## 2022-11-07 ENCOUNTER — TELEPHONE (OUTPATIENT)
Dept: SURGERY | Facility: CLINIC | Age: 62
End: 2022-11-07

## 2022-11-07 DIAGNOSIS — R35.0 URINARY FREQUENCY: Primary | ICD-10-CM

## 2022-11-07 NOTE — TELEPHONE ENCOUNTER
Per pt has been experiencing frequent urination, states woke up about 9 times last night.  Please advise

## 2022-11-09 ENCOUNTER — LAB ENCOUNTER (OUTPATIENT)
Dept: LAB | Facility: HOSPITAL | Age: 62
End: 2022-11-09
Attending: UROLOGY
Payer: COMMERCIAL

## 2022-11-09 DIAGNOSIS — R35.0 URINARY FREQUENCY: ICD-10-CM

## 2022-11-09 LAB
BILIRUB UR QL: NEGATIVE
COLOR UR: YELLOW
GLUCOSE UR-MCNC: NEGATIVE MG/DL
HGB UR QL STRIP.AUTO: NEGATIVE
KETONES UR-MCNC: NEGATIVE MG/DL
LEUKOCYTE ESTERASE UR QL STRIP.AUTO: NEGATIVE
NITRITE UR QL STRIP.AUTO: NEGATIVE
PH UR: 6 [PH] (ref 5–8)
PROT UR-MCNC: 30 MG/DL
SP GR UR STRIP: 1.02 (ref 1–1.03)
UROBILINOGEN UR STRIP-ACNC: <2
VIT C UR-MCNC: 20 MG/DL

## 2022-11-09 PROCEDURE — 87086 URINE CULTURE/COLONY COUNT: CPT

## 2022-11-09 PROCEDURE — 81001 URINALYSIS AUTO W/SCOPE: CPT

## 2022-11-09 NOTE — TELEPHONE ENCOUNTER
I s/w pt and informed him of the response msg from [facility] as stated below and pt stated that he changed his diet and his nocturia and urinary frequency got much better and he will submit the ua and C&S today and just keep his appt in Dec. I told him we will contact him with those results. MD Jeanne Hayward, RN; [facility] Clinical Staff  Caller: Unspecified (2 days ago, 10:04 AM)  Agree with RN triage.

## 2022-12-15 ENCOUNTER — OFFICE VISIT (OUTPATIENT)
Dept: SURGERY | Facility: CLINIC | Age: 62
End: 2022-12-15
Payer: COMMERCIAL

## 2022-12-15 DIAGNOSIS — N52.9 ERECTILE DYSFUNCTION, UNSPECIFIED ERECTILE DYSFUNCTION TYPE: ICD-10-CM

## 2022-12-15 DIAGNOSIS — N40.1 BPH ASSOCIATED WITH NOCTURIA: Primary | ICD-10-CM

## 2022-12-15 DIAGNOSIS — R35.1 BPH ASSOCIATED WITH NOCTURIA: Primary | ICD-10-CM

## 2022-12-15 DIAGNOSIS — Z12.5 PROSTATE CANCER SCREENING: ICD-10-CM

## 2022-12-15 PROCEDURE — 99214 OFFICE O/P EST MOD 30 MIN: CPT | Performed by: UROLOGY

## 2023-02-01 ENCOUNTER — OFFICE VISIT (OUTPATIENT)
Dept: SURGERY | Facility: CLINIC | Age: 63
End: 2023-02-01

## 2023-02-01 DIAGNOSIS — N40.1 BPH ASSOCIATED WITH NOCTURIA: Primary | ICD-10-CM

## 2023-02-01 DIAGNOSIS — R35.1 BPH ASSOCIATED WITH NOCTURIA: Primary | ICD-10-CM

## 2023-02-01 PROCEDURE — 99214 OFFICE O/P EST MOD 30 MIN: CPT | Performed by: UROLOGY

## 2023-02-01 PROCEDURE — 51741 ELECTRO-UROFLOWMETRY FIRST: CPT | Performed by: UROLOGY

## 2023-02-01 RX ORDER — TAMSULOSIN HYDROCHLORIDE 0.4 MG/1
0.4 CAPSULE ORAL
Qty: 30 CAPSULE | Refills: 3 | Status: SHIPPED | OUTPATIENT
Start: 2023-02-01

## 2023-02-25 DIAGNOSIS — N40.1 BPH ASSOCIATED WITH NOCTURIA: ICD-10-CM

## 2023-02-25 DIAGNOSIS — R35.1 BPH ASSOCIATED WITH NOCTURIA: ICD-10-CM

## 2023-02-27 ENCOUNTER — OFFICE VISIT (OUTPATIENT)
Dept: FAMILY MEDICINE CLINIC | Facility: CLINIC | Age: 63
End: 2023-02-27

## 2023-02-27 VITALS
HEIGHT: 75 IN | SYSTOLIC BLOOD PRESSURE: 138 MMHG | BODY MASS INDEX: 26.64 KG/M2 | DIASTOLIC BLOOD PRESSURE: 68 MMHG | TEMPERATURE: 98 F | WEIGHT: 214.25 LBS | HEART RATE: 60 BPM

## 2023-02-27 DIAGNOSIS — Z82.49 FAMILY HISTORY OF EARLY CAD: ICD-10-CM

## 2023-02-27 DIAGNOSIS — I10 PRIMARY HYPERTENSION: ICD-10-CM

## 2023-02-27 DIAGNOSIS — Z76.89 ESTABLISHING CARE WITH NEW DOCTOR, ENCOUNTER FOR: ICD-10-CM

## 2023-02-27 DIAGNOSIS — Z12.11 COLON CANCER SCREENING: ICD-10-CM

## 2023-02-27 DIAGNOSIS — Z23 NEED FOR SHINGLES VACCINE: ICD-10-CM

## 2023-02-27 DIAGNOSIS — Z00.00 ROUTINE PHYSICAL EXAMINATION: Primary | ICD-10-CM

## 2023-02-27 PROCEDURE — 3078F DIAST BP <80 MM HG: CPT | Performed by: FAMILY MEDICINE

## 2023-02-27 PROCEDURE — 3008F BODY MASS INDEX DOCD: CPT | Performed by: FAMILY MEDICINE

## 2023-02-27 PROCEDURE — 99386 PREV VISIT NEW AGE 40-64: CPT | Performed by: FAMILY MEDICINE

## 2023-02-27 PROCEDURE — 3075F SYST BP GE 130 - 139MM HG: CPT | Performed by: FAMILY MEDICINE

## 2023-02-27 RX ORDER — ATORVASTATIN CALCIUM 20 MG/1
TABLET, FILM COATED ORAL
COMMUNITY
Start: 2023-02-20

## 2023-02-27 RX ORDER — ERGOCALCIFEROL 1.25 MG/1
50000 CAPSULE ORAL WEEKLY
COMMUNITY
Start: 2023-01-19

## 2023-02-27 NOTE — PATIENT INSTRUCTIONS
All adult screening ordered and done appropriate for patient's age and gender and risk factors and complaints. Encouraged physical fitness and daily physical activity daily. Monitor blood pressures and record at home. Limit salt intake. Comply with medications.

## 2023-03-06 RX ORDER — TAMSULOSIN HYDROCHLORIDE 0.4 MG/1
CAPSULE ORAL
Qty: 90 CAPSULE | Refills: 1 | Status: SHIPPED | OUTPATIENT
Start: 2023-03-06

## 2023-03-10 ENCOUNTER — TELEPHONE (OUTPATIENT)
Dept: SURGERY | Facility: CLINIC | Age: 63
End: 2023-03-10

## 2023-03-10 RX ORDER — TADALAFIL 5 MG/1
5 TABLET ORAL DAILY
Qty: 90 TABLET | Refills: 3 | Status: SHIPPED | OUTPATIENT
Start: 2023-03-10

## 2023-03-17 ENCOUNTER — MED REC SCAN ONLY (OUTPATIENT)
Dept: FAMILY MEDICINE CLINIC | Facility: CLINIC | Age: 63
End: 2023-03-17

## 2023-04-03 ENCOUNTER — LAB ENCOUNTER (OUTPATIENT)
Dept: LAB | Facility: HOSPITAL | Age: 63
End: 2023-04-03
Attending: FAMILY MEDICINE
Payer: COMMERCIAL

## 2023-04-03 ENCOUNTER — ORDER TRANSCRIPTION (OUTPATIENT)
Dept: ADMINISTRATIVE | Facility: HOSPITAL | Age: 63
End: 2023-04-03

## 2023-04-03 DIAGNOSIS — Z00.00 ROUTINE PHYSICAL EXAMINATION: ICD-10-CM

## 2023-04-03 DIAGNOSIS — Z13.6 SCREENING FOR CARDIOVASCULAR CONDITION: Primary | ICD-10-CM

## 2023-04-03 LAB
BILIRUB UR QL: NEGATIVE
CLARITY UR: CLEAR
COLOR UR: COLORLESS
GLUCOSE UR-MCNC: NORMAL MG/DL
HGB UR QL STRIP.AUTO: NEGATIVE
KETONES UR-MCNC: NEGATIVE MG/DL
LEUKOCYTE ESTERASE UR QL STRIP.AUTO: NEGATIVE
NITRITE UR QL STRIP.AUTO: NEGATIVE
PH UR: 7.5 [PH] (ref 5–8)
PROT UR-MCNC: NEGATIVE MG/DL
SP GR UR STRIP: 1.01 (ref 1–1.03)
UROBILINOGEN UR STRIP-ACNC: NORMAL

## 2023-04-04 ENCOUNTER — LAB ENCOUNTER (OUTPATIENT)
Dept: LAB | Facility: HOSPITAL | Age: 63
End: 2023-04-04
Attending: FAMILY MEDICINE
Payer: COMMERCIAL

## 2023-04-04 DIAGNOSIS — Z00.00 ROUTINE PHYSICAL EXAMINATION: ICD-10-CM

## 2023-04-04 LAB
ALBUMIN SERPL-MCNC: 3.6 G/DL (ref 3.4–5)
ALBUMIN/GLOB SERPL: 1.2 {RATIO} (ref 1–2)
ALP LIVER SERPL-CCNC: 69 U/L
ALT SERPL-CCNC: 22 U/L
ANION GAP SERPL CALC-SCNC: 5 MMOL/L (ref 0–18)
AST SERPL-CCNC: 25 U/L (ref 15–37)
BASOPHILS # BLD AUTO: 0.01 X10(3) UL (ref 0–0.2)
BASOPHILS NFR BLD AUTO: 0.2 %
BILIRUB SERPL-MCNC: 1.1 MG/DL (ref 0.1–2)
BUN BLD-MCNC: 17 MG/DL (ref 7–18)
BUN/CREAT SERPL: 12.5 (ref 10–20)
CALCIUM BLD-MCNC: 8.7 MG/DL (ref 8.5–10.1)
CHLORIDE SERPL-SCNC: 109 MMOL/L (ref 98–112)
CHOLEST SERPL-MCNC: 128 MG/DL (ref ?–200)
CO2 SERPL-SCNC: 30 MMOL/L (ref 21–32)
CREAT BLD-MCNC: 1.36 MG/DL
DEPRECATED RDW RBC AUTO: 39.8 FL (ref 35.1–46.3)
EOSINOPHIL # BLD AUTO: 0.03 X10(3) UL (ref 0–0.7)
EOSINOPHIL NFR BLD AUTO: 0.5 %
ERYTHROCYTE [DISTWIDTH] IN BLOOD BY AUTOMATED COUNT: 12.8 % (ref 11–15)
FASTING PATIENT LIPID ANSWER: YES
FASTING STATUS PATIENT QL REPORTED: YES
GFR SERPLBLD BASED ON 1.73 SQ M-ARVRAT: 59 ML/MIN/1.73M2 (ref 60–?)
GLOBULIN PLAS-MCNC: 3.1 G/DL (ref 2.8–4.4)
GLUCOSE BLD-MCNC: 91 MG/DL (ref 70–99)
HCT VFR BLD AUTO: 42.9 %
HDLC SERPL-MCNC: 76 MG/DL (ref 40–59)
HGB BLD-MCNC: 13.3 G/DL
IMM GRANULOCYTES # BLD AUTO: 0.01 X10(3) UL (ref 0–1)
IMM GRANULOCYTES NFR BLD: 0.2 %
LDLC SERPL CALC-MCNC: 42 MG/DL (ref ?–100)
LYMPHOCYTES # BLD AUTO: 2.71 X10(3) UL (ref 1–4)
LYMPHOCYTES NFR BLD AUTO: 41.4 %
MCH RBC QN AUTO: 26.6 PG (ref 26–34)
MCHC RBC AUTO-ENTMCNC: 31 G/DL (ref 31–37)
MCV RBC AUTO: 85.8 FL
MONOCYTES # BLD AUTO: 0.59 X10(3) UL (ref 0.1–1)
MONOCYTES NFR BLD AUTO: 9 %
NEUTROPHILS # BLD AUTO: 3.2 X10 (3) UL (ref 1.5–7.7)
NEUTROPHILS # BLD AUTO: 3.2 X10(3) UL (ref 1.5–7.7)
NEUTROPHILS NFR BLD AUTO: 48.7 %
NONHDLC SERPL-MCNC: 52 MG/DL (ref ?–130)
OSMOLALITY SERPL CALC.SUM OF ELEC: 299 MOSM/KG (ref 275–295)
PLATELET # BLD AUTO: 176 10(3)UL (ref 150–450)
POTASSIUM SERPL-SCNC: 3.9 MMOL/L (ref 3.5–5.1)
PROT SERPL-MCNC: 6.7 G/DL (ref 6.4–8.2)
PSA SERPL-MCNC: 2.65 NG/ML (ref ?–4)
RBC # BLD AUTO: 5 X10(6)UL
SODIUM SERPL-SCNC: 144 MMOL/L (ref 136–145)
TRIGL SERPL-MCNC: 38 MG/DL (ref 30–149)
TSI SER-ACNC: 1.62 MIU/ML (ref 0.36–3.74)
VLDLC SERPL CALC-MCNC: 5 MG/DL (ref 0–30)
WBC # BLD AUTO: 6.6 X10(3) UL (ref 4–11)

## 2023-04-04 PROCEDURE — 85025 COMPLETE CBC W/AUTO DIFF WBC: CPT

## 2023-04-04 PROCEDURE — 84153 ASSAY OF PSA TOTAL: CPT

## 2023-04-04 PROCEDURE — 36415 COLL VENOUS BLD VENIPUNCTURE: CPT

## 2023-04-04 PROCEDURE — 80053 COMPREHEN METABOLIC PANEL: CPT

## 2023-04-04 PROCEDURE — 80061 LIPID PANEL: CPT

## 2023-04-04 PROCEDURE — 84443 ASSAY THYROID STIM HORMONE: CPT

## 2023-04-21 ENCOUNTER — HOSPITAL ENCOUNTER (OUTPATIENT)
Dept: CT IMAGING | Age: 63
Discharge: HOME | End: 2023-04-21
Attending: INTERNAL MEDICINE

## 2023-04-21 DIAGNOSIS — Z13.6 SCREENING FOR CARDIOVASCULAR CONDITION: ICD-10-CM

## 2023-04-21 NOTE — PROGRESS NOTES
Date of Service 2023    [address]  Date of Birth 1960    Patient Age: 65year old    PCP: Candance Mayer, DO  [facility]    Consult Type  Type Scan/Screening: Heart Scan  Preliminary Heart Scan Score: 0                Body Mass Index  There is no height or weight on file to calculate BMI. Lipid Profile  Cholesterol: 128, done on 2023. HDL Cholesterol: 76, done on 2023. LDL Cholesterol: 42, done on 2023. TriGlycerides 38, done on 2023. He is on cholesterol med. Nurse Review  Risk factor information and results reviewed with Nurse: Yes     He is on BP meds. He has family history of heart disease: his mother, brother and sister all  from heart attacks. Recommended Follow Up:  Consult your physician regarding[de-identified] Final Heart Scan Report; Discuss potential for Incidental Finding    No data recorded      Recommendations for Change:  Nutrition Changes: Low Saturated Fat  Cholesterol Modification (goal of therapy depends upon your risk): No Change Needed     Smoking Cessation: No Change Needed  Weight Management: Maintain Current Weight  Stress Management: Adopt Stress Management Techniques  Repeat Heart Scan: 5 years if Calcium Score is 0. 0; Discuss with your Physician          [place] Recommended Resources:  Recommended Resources: Upcoming Classes, Medical Services and Health Library [email or web address]. Graeme Hatchet, RN        Please Contact the Nurse Heart Line with any Questions or Concerns [phone].

## 2023-05-04 ENCOUNTER — OFFICE VISIT (OUTPATIENT)
Dept: SURGERY | Facility: CLINIC | Age: 63
End: 2023-05-04

## 2023-05-04 DIAGNOSIS — N40.1 BPH ASSOCIATED WITH NOCTURIA: Primary | ICD-10-CM

## 2023-05-04 DIAGNOSIS — R35.1 BPH ASSOCIATED WITH NOCTURIA: Primary | ICD-10-CM

## 2023-05-04 DIAGNOSIS — Z12.5 PROSTATE CANCER SCREENING: ICD-10-CM

## 2023-05-04 DIAGNOSIS — N52.9 ERECTILE DYSFUNCTION, UNSPECIFIED ERECTILE DYSFUNCTION TYPE: ICD-10-CM

## 2023-05-04 PROCEDURE — 99214 OFFICE O/P EST MOD 30 MIN: CPT | Performed by: UROLOGY

## 2023-05-04 RX ORDER — TADALAFIL 10 MG/1
10 TABLET ORAL
Qty: 10 TABLET | Refills: 3 | Status: SHIPPED | OUTPATIENT
Start: 2023-05-04

## 2023-09-18 ENCOUNTER — LAB ENCOUNTER (OUTPATIENT)
Dept: LAB | Age: 63
End: 2023-09-18
Attending: FAMILY MEDICINE
Payer: COMMERCIAL

## 2023-09-18 ENCOUNTER — PATIENT MESSAGE (OUTPATIENT)
Dept: FAMILY MEDICINE CLINIC | Facility: CLINIC | Age: 63
End: 2023-09-18

## 2023-09-18 ENCOUNTER — OFFICE VISIT (OUTPATIENT)
Dept: FAMILY MEDICINE CLINIC | Facility: CLINIC | Age: 63
End: 2023-09-18

## 2023-09-18 VITALS
WEIGHT: 203 LBS | TEMPERATURE: 98 F | BODY MASS INDEX: 25.24 KG/M2 | HEIGHT: 75 IN | SYSTOLIC BLOOD PRESSURE: 148 MMHG | DIASTOLIC BLOOD PRESSURE: 72 MMHG | HEART RATE: 89 BPM

## 2023-09-18 DIAGNOSIS — R35.0 URINARY FREQUENCY: ICD-10-CM

## 2023-09-18 DIAGNOSIS — R79.89 ELEVATED SERUM CREATININE: ICD-10-CM

## 2023-09-18 DIAGNOSIS — Z12.11 COLON CANCER SCREENING: Primary | ICD-10-CM

## 2023-09-18 LAB
ALBUMIN SERPL-MCNC: 3.9 G/DL (ref 3.4–5)
ANION GAP SERPL CALC-SCNC: 4 MMOL/L (ref 0–18)
APPEARANCE: CLEAR
BILIRUB UR QL: NEGATIVE
BILIRUBIN: NEGATIVE
BUN BLD-MCNC: 17 MG/DL (ref 7–18)
BUN/CREAT SERPL: 13.2 (ref 10–20)
CALCIUM BLD-MCNC: 9.2 MG/DL (ref 8.5–10.1)
CHLORIDE SERPL-SCNC: 109 MMOL/L (ref 98–112)
CLARITY UR: CLEAR
CO2 SERPL-SCNC: 30 MMOL/L (ref 21–32)
COLOR UR: YELLOW
CREAT BLD-MCNC: 1.29 MG/DL
EGFRCR SERPLBLD CKD-EPI 2021: 63 ML/MIN/1.73M2 (ref 60–?)
EST. AVERAGE GLUCOSE BLD GHB EST-MCNC: 120 MG/DL (ref 68–126)
GLUCOSE (URINE DIPSTICK): NEGATIVE MG/DL
GLUCOSE BLD-MCNC: 98 MG/DL (ref 70–99)
GLUCOSE UR-MCNC: NORMAL MG/DL
HBA1C MFR BLD: 5.8 % (ref ?–5.7)
HGB UR QL STRIP.AUTO: NEGATIVE
KETONES (URINE DIPSTICK): NEGATIVE MG/DL
KETONES UR-MCNC: NEGATIVE MG/DL
LEUKOCYTE ESTERASE UR QL STRIP.AUTO: NEGATIVE
LEUKOCYTES: NEGATIVE
MULTISTIX LOT#: 7893 NUMERIC
NITRITE UR QL STRIP.AUTO: NEGATIVE
NITRITE, URINE: NEGATIVE
OCCULT BLOOD: NEGATIVE
OSMOLALITY SERPL CALC.SUM OF ELEC: 298 MOSM/KG (ref 275–295)
PH UR: 6.5 [PH] (ref 5–8)
PH, URINE: 7 (ref 4.5–8)
PHOSPHATE SERPL-MCNC: 3 MG/DL (ref 2.5–4.9)
POTASSIUM SERPL-SCNC: 4 MMOL/L (ref 3.5–5.1)
PROTEIN (URINE DIPSTICK): NEGATIVE MG/DL
SODIUM SERPL-SCNC: 143 MMOL/L (ref 136–145)
SP GR UR STRIP: 1.02 (ref 1–1.03)
SPECIFIC GRAVITY: 1.02 (ref 1–1.03)
URINE-COLOR: YELLOW
UROBILINOGEN UR STRIP-ACNC: NORMAL
UROBILINOGEN,SEMI-QN: 0.2 MG/DL (ref 0–1.9)

## 2023-09-18 PROCEDURE — 36415 COLL VENOUS BLD VENIPUNCTURE: CPT

## 2023-09-18 PROCEDURE — 3008F BODY MASS INDEX DOCD: CPT | Performed by: FAMILY MEDICINE

## 2023-09-18 PROCEDURE — 80069 RENAL FUNCTION PANEL: CPT

## 2023-09-18 PROCEDURE — 81002 URINALYSIS NONAUTO W/O SCOPE: CPT | Performed by: FAMILY MEDICINE

## 2023-09-18 PROCEDURE — 3077F SYST BP >= 140 MM HG: CPT | Performed by: FAMILY MEDICINE

## 2023-09-18 PROCEDURE — 3078F DIAST BP <80 MM HG: CPT | Performed by: FAMILY MEDICINE

## 2023-09-18 PROCEDURE — 83036 HEMOGLOBIN GLYCOSYLATED A1C: CPT

## 2023-09-18 PROCEDURE — 99214 OFFICE O/P EST MOD 30 MIN: CPT | Performed by: FAMILY MEDICINE

## 2023-09-18 NOTE — PROGRESS NOTES
Subjective:     Patient ID: Shilpa Mcmahan is a 59year old male. This patient is a 61-year-old hypertensive/hyperlipidemic -American gentleman with a history of an enlarged prostate who gives the following report:    Nocturia x several times and full bladder release over the last 8-10 days. Normal frequency during the day. Not urgent. No visual changes. Sleep broken. Day fatigue. Stated history of enlarged prostate. No blood in urine or painful urination. Normal stream without dribbling. History of lumbar spinal fusion May 2, 2022 L3-L4. Patient had a nonsurgical treated lumbar region herniated disc L5-S1 and symptoms resolved with conservative treatment including PT. Patient is compliant with all his medications. History/Other:   Review of Systems  Current Outpatient Medications   Medication Sig Dispense Refill    Tadalafil 10 MG Oral Tab Take 1 tablet (10 mg total) by mouth daily as needed for Erectile Dysfunction. 10 tablet 3    tadalafil 5 MG Oral Tab Take 1 tablet (5 mg total) by mouth daily. 90 tablet 3    atorvastatin 20 MG Oral Tab       ergocalciferol 1.25 MG (74317 UT) Oral Cap Take 1 capsule (50,000 Units total) by mouth once a week. losartan Potassium 50 MG Oral Tab Take 1 tablet (50 mg total) by mouth daily. amLODIPine Besylate 10 MG Oral Tab Take 1 tablet (10 mg total) by mouth daily. Allergies:No Known Allergies    Past Medical History:   Diagnosis Date    Enlarged prostate     HLD (hyperlipidemia)     HTN (hypertension)       Past Surgical History:   Procedure Laterality Date    REPAIR ACHILLES TENDON,PRIMARY  2007    SPINAL FUSION  05/2022      History reviewed. No pertinent family history.    Social History:   Social History     Socioeconomic History    Marital status: Single   Tobacco Use    Smoking status: Never    Smokeless tobacco: Never   Vaping Use    Vaping Use: Never used   Substance and Sexual Activity    Alcohol use: Not Currently    Drug use: Not Currently   Other Topics Concern    Caffeine Concern No    Exercise Yes     Comment: Push ups/weights. Social History Narrative    The patient does not use an assistive device. .      The patient does live in a home with stairs. Objective:    09/18/23  1104   BP: 148/72   Pulse:    Temp:        Physical Exam  Constitutional:       General: He is not in acute distress. Appearance: Normal appearance. He is not ill-appearing. HENT:      Head: Normocephalic and atraumatic. Cardiovascular:      Rate and Rhythm: Normal rate and regular rhythm. Heart sounds:      No gallop. Pulmonary:      Effort: Pulmonary effort is normal.      Breath sounds: Normal breath sounds. Neurological:      Mental Status: He is alert and oriented to person, place, and time. Assessment & Plan:   1. Colon cancer screening  Colon screening via fit test versus conventional colonoscopy. 2. Urinary frequency  The following urine studies have been sent. A1c at 5.8. Referred to urology to rule out hypertonic bladder.  - POC Urinalysis, Manual Dip without microscopy [[id number]]  - Urinalysis, Routine [E]; Future  - Urine Culture, Routine [E]; Future  - Urinalysis, Routine [E]  - Urine Culture, Routine [E]  - Hemoglobin A1C [E]; Future  - Urology Referral - In Network    3. Elevated serum creatinine  Ordered. - Renal Function Panel [E]; Future        Orders Placed This Encounter      POC Urinalysis, Manual Dip without microscopy [[id number]]      Urinalysis, Routine [E]      Urine Culture, Routine [E]      Meds This Visit:  Requested Prescriptions      No prescriptions requested or ordered in this encounter       Imaging & Referrals:  None     Patient Instructions   Consider increase losartan to 100 mg orally daily. Urine studies sent. A1c to be ordered as well to rule out/doubt diabetes or prediabetic state. Renal status to be rechecked on today. May need follow up with urology.     Return if symptoms worsen or fail to improve.

## 2023-09-18 NOTE — PATIENT INSTRUCTIONS
Consider increase losartan to 100 mg orally daily. Urine studies sent. A1c to be ordered as well to rule out/doubt diabetes or prediabetic state. Renal status to be rechecked on today. May need follow up with urology.

## 2023-09-19 NOTE — TELEPHONE ENCOUNTER
From: Gerardo Ortiz  To: Erasmo Melgar  Sent: 9/18/2023 5:29 PM CDT  Subject: Question regarding HEMOGLOBIN A1C    Good evening Dr. Stokes First,  I looked at the test results an if I'm reading them right, the values appeR to be somewhat normal to borderline. Please follow up via phone or Mychart with your professional determination. Thank you!

## 2023-10-23 ENCOUNTER — TELEPHONE (OUTPATIENT)
Dept: FAMILY MEDICINE CLINIC | Facility: CLINIC | Age: 63
End: 2023-10-23

## 2023-10-23 NOTE — TELEPHONE ENCOUNTER
Verified name and . Patient was last seen in office with Dr. Ailyn Fleming on 23 for urinary frequency. He states that his symptoms continue where he is urinating every 45 minutes to an hour during the night. He is requesting follow up appointment with Dr. Ailyn Fleming only.     Appointment scheduled:  Future Appointments   Date Time Provider Mignon Rashmi   10/31/2023  4:00 PM DO JOSEPH Espinosa   2024  8:00 AM DO JOSEPH Espinosa   2024  9:45 AM Anali Ferguson MD [facility]

## 2023-10-31 ENCOUNTER — OFFICE VISIT (OUTPATIENT)
Dept: FAMILY MEDICINE CLINIC | Facility: CLINIC | Age: 63
End: 2023-10-31

## 2023-10-31 ENCOUNTER — LAB ENCOUNTER (OUTPATIENT)
Dept: LAB | Age: 63
End: 2023-10-31
Attending: FAMILY MEDICINE
Payer: COMMERCIAL

## 2023-10-31 VITALS
SYSTOLIC BLOOD PRESSURE: 150 MMHG | BODY MASS INDEX: 25.36 KG/M2 | WEIGHT: 204 LBS | TEMPERATURE: 98 F | HEART RATE: 77 BPM | DIASTOLIC BLOOD PRESSURE: 72 MMHG | HEIGHT: 75 IN

## 2023-10-31 DIAGNOSIS — R35.0 URINARY FREQUENCY: ICD-10-CM

## 2023-10-31 DIAGNOSIS — R82.998 FOAMY URINE: ICD-10-CM

## 2023-10-31 DIAGNOSIS — I10 PRIMARY HYPERTENSION: Primary | ICD-10-CM

## 2023-10-31 LAB
ALBUMIN SERPL-MCNC: 4.2 G/DL (ref 3.2–4.8)
ANION GAP SERPL CALC-SCNC: 9 MMOL/L (ref 0–18)
BILIRUB UR QL: NEGATIVE
BUN BLD-MCNC: 17 MG/DL (ref 9–23)
BUN/CREAT SERPL: 12.8 (ref 10–20)
CALCIUM BLD-MCNC: 9.5 MG/DL (ref 8.7–10.4)
CHLORIDE SERPL-SCNC: 104 MMOL/L (ref 98–112)
CLARITY UR: CLEAR
CO2 SERPL-SCNC: 27 MMOL/L (ref 21–32)
CREAT BLD-MCNC: 1.33 MG/DL
EGFRCR SERPLBLD CKD-EPI 2021: 60 ML/MIN/1.73M2 (ref 60–?)
GLUCOSE BLD-MCNC: 79 MG/DL (ref 70–99)
GLUCOSE UR-MCNC: NORMAL MG/DL
HGB UR QL STRIP.AUTO: NEGATIVE
KETONES UR-MCNC: NEGATIVE MG/DL
LEUKOCYTE ESTERASE UR QL STRIP.AUTO: NEGATIVE
NITRITE UR QL STRIP.AUTO: NEGATIVE
OSMOLALITY SERPL CALC.SUM OF ELEC: 290 MOSM/KG (ref 275–295)
PH UR: 6.5 [PH] (ref 5–8)
PHOSPHATE SERPL-MCNC: 2.5 MG/DL (ref 2.4–5.1)
POTASSIUM SERPL-SCNC: 3.7 MMOL/L (ref 3.5–5.1)
PROT UR-MCNC: NEGATIVE MG/DL
SODIUM SERPL-SCNC: 140 MMOL/L (ref 136–145)
SP GR UR STRIP: 1.02 (ref 1–1.03)
UROBILINOGEN UR STRIP-ACNC: NORMAL

## 2023-10-31 PROCEDURE — 80069 RENAL FUNCTION PANEL: CPT

## 2023-10-31 PROCEDURE — 3077F SYST BP >= 140 MM HG: CPT | Performed by: FAMILY MEDICINE

## 2023-10-31 PROCEDURE — 99214 OFFICE O/P EST MOD 30 MIN: CPT | Performed by: FAMILY MEDICINE

## 2023-10-31 PROCEDURE — 81003 URINALYSIS AUTO W/O SCOPE: CPT

## 2023-10-31 PROCEDURE — 3078F DIAST BP <80 MM HG: CPT | Performed by: FAMILY MEDICINE

## 2023-10-31 PROCEDURE — 36415 COLL VENOUS BLD VENIPUNCTURE: CPT

## 2023-10-31 PROCEDURE — 3008F BODY MASS INDEX DOCD: CPT | Performed by: FAMILY MEDICINE

## 2023-10-31 RX ORDER — HYDROCHLOROTHIAZIDE 12.5 MG/1
CAPSULE, GELATIN COATED ORAL
COMMUNITY
Start: 2021-05-28

## 2023-10-31 RX ORDER — LOSARTAN POTASSIUM 100 MG/1
100 TABLET ORAL DAILY
COMMUNITY
Start: 2023-09-04

## 2023-10-31 RX ORDER — AMLODIPINE BESYLATE AND ATORVASTATIN CALCIUM 10; 20 MG/1; MG/1
TABLET, FILM COATED ORAL
COMMUNITY
Start: 2023-10-22

## 2023-10-31 RX ORDER — CYCLOBENZAPRINE HCL 10 MG
TABLET ORAL
COMMUNITY

## 2023-10-31 RX ORDER — HYDROCHLOROTHIAZIDE 12.5 MG/1
12.5 CAPSULE, GELATIN COATED ORAL DAILY
Qty: 90 CAPSULE | Refills: 0 | Status: SHIPPED | OUTPATIENT
Start: 2023-10-31

## 2023-10-31 RX ORDER — AMLODIPINE BESYLATE 5 MG/1
5 TABLET ORAL DAILY
Qty: 90 TABLET | Refills: 0 | Status: SHIPPED | OUTPATIENT
Start: 2023-10-31

## 2023-10-31 NOTE — PATIENT INSTRUCTIONS
Hold on atorvastatin. Continue with vitamin D 3. We are going to discontinue the combination amlodipine/atorvastatin. We can place the patient on individual amlodipine and we can monitor the patient's lipid levels every 6 months in order to make sure that we are maintaining good cholesterol levels. Patient is now being prescribed amlodipine at 5 mg strength along with hydrochlorothiazide 12.5 mg orally daily and he is to continue with the losartan 100 mg orally daily. Urine sample being sent to the lab along with blood test for renal function.

## 2023-10-31 NOTE — PROGRESS NOTES
Subjective:     Patient ID: Denise Joya is a 59year old male. This patient is a 45-year-old hypertensive with strong family history of MI and coronary artery disease here to follow-up on the continuation of nocturia x7. This is totally sleep disrupting in each time it is a full bladder release. This does not happen during the day. Patient also complains of lower left flank discomfort which is intermittent and may or may not be related to the complaint. The patient is taking combination amlodipine/atorvastatin and 10/20 mg strength and is curious as to whether the atorvastatin is creating the problem. Patient is compliant as he takes this medication along with 100 mg of losartan as well as 12.5 mg of hydrochlorothiazide orally daily. Patient also complains that his urine is starting to appear to have bubbles (foamy) all the time. History/Other:   Review of Systems  Current Outpatient Medications   Medication Sig Dispense Refill    losartan 100 MG Oral Tab Take 1 tablet (100 mg total) by mouth daily. amLODIPine-Atorvastatin 10-20 MG Oral Tab       hydroCHLOROthiazide 12.5 MG Oral Cap Take 1 capsule (12.5 mg total) by mouth daily. 90 capsule 0    amLODIPine 5 MG Oral Tab Take 1 tablet (5 mg total) by mouth daily. 90 tablet 0    tadalafil 5 MG Oral Tab Take 1 tablet (5 mg total) by mouth daily. 90 tablet 3    ergocalciferol 1.25 MG (88968 UT) Oral Cap Take 1 capsule (50,000 Units total) by mouth once a week.       cyclobenzaprine 10 MG Oral Tab TAKE 1 TABLET BY MOUTH NIGHTLY AS NEEDED FOR PAIN FOR UP TO 10 DAYS. (Patient not taking: Reported on 10/31/2023)      hydroCHLOROthiazide 12.5 MG Oral Cap  (Patient not taking: Reported on 10/31/2023)       Allergies:No Known Allergies    Past Medical History:   Diagnosis Date    Enlarged prostate     HLD (hyperlipidemia)     HTN (hypertension)       Past Surgical History:   Procedure Laterality Date    REPAIR ACHILLES TENDON,PRIMARY  2007    SPINAL FUSION  05/2022      History reviewed. No pertinent family history. Social History:   Social History     Socioeconomic History    Marital status: Single   Tobacco Use    Smoking status: Never    Smokeless tobacco: Never   Vaping Use    Vaping Use: Never used   Substance and Sexual Activity    Alcohol use: Not Currently    Drug use: Not Currently   Other Topics Concern    Caffeine Concern No    Exercise Yes     Comment: Push ups/weights. Social History Narrative    The patient does not use an assistive device. .      The patient does live in a home with stairs. Objective:    10/31/23  1643   BP: 150/72   Pulse:    Temp:        Physical Exam  Constitutional:       General: He is not in acute distress. Appearance: Normal appearance. He is not ill-appearing. HENT:      Head: Normocephalic and atraumatic. Cardiovascular:      Rate and Rhythm: Normal rate and regular rhythm. Heart sounds:      No gallop. Pulmonary:      Effort: Pulmonary effort is normal. No respiratory distress. Breath sounds: Normal breath sounds. Musculoskeletal:        Back:       Comments: Region of intermittent back discomfort as depicted. Neurological:      Mental Status: He is alert. Assessment & Plan:   1. Primary hypertension  Prescribed. Amlodipine dosage dropped to the 5 mg strength. - hydroCHLOROthiazide 12.5 MG Oral Cap; Take 1 capsule (12.5 mg total) by mouth daily. Dispense: 90 capsule; Refill: 0  - amLODIPine 5 MG Oral Tab; Take 1 tablet (5 mg total) by mouth daily. Dispense: 90 tablet; Refill: 0    2. Urinary frequency  The following has been ordered. - Urinalysis with Culture Reflex [E]; Future  - Renal Function Panel [E]; Future    3. Foamy urine  Ordered. - Renal Function Panel [E];  Future    Orders Placed This Encounter      Urinalysis with Culture Reflex [E]      Meds This Visit:  Requested Prescriptions     Signed Prescriptions Disp Refills    hydroCHLOROthiazide 12.5 MG Oral Cap 90 capsule 0     Sig: Take 1 capsule (12.5 mg total) by mouth daily. amLODIPine 5 MG Oral Tab 90 tablet 0     Sig: Take 1 tablet (5 mg total) by mouth daily. Imaging & Referrals:  None     Patient Instructions   Hold on atorvastatin. Continue with vitamin D 3. We are going to discontinue the combination amlodipine/atorvastatin. We can place the patient on individual amlodipine and we can monitor the patient's lipid levels every 6 months in order to make sure that we are maintaining good cholesterol levels. Patient is now being prescribed amlodipine at 5 mg strength along with hydrochlorothiazide 12.5 mg orally daily and he is to continue with the losartan 100 mg orally daily. Urine sample being sent to the lab along with blood test for renal function. Return in about 6 weeks (around 12/12/2023), or if symptoms worsen or fail to improve.

## 2023-11-01 ENCOUNTER — PATIENT MESSAGE (OUTPATIENT)
Dept: FAMILY MEDICINE CLINIC | Facility: CLINIC | Age: 63
End: 2023-11-01

## 2023-11-02 NOTE — TELEPHONE ENCOUNTER
From: Victor Manuel Campbell  To: Sathish Rashaun  Sent: 11/1/2023 9:03 PM CDT  Subject: Kidney/Urine    Dr. Shawna Mcintyre, since all of the test results are within stable range, what could possibly be causing the urine to form bubbles each time urine is released from my bladder? ? Should we evaluate after I stop using the combination of 100mg Losartan and the 20mg Astorvastatin? ? I will start the 5mg Amlodipine and the 12.5mg of Hydrochlorothiazide asap!!.. Tor Gold Today!!

## 2024-01-17 RX ORDER — LOSARTAN POTASSIUM 100 MG/1
100 TABLET ORAL DAILY
Qty: 90 TABLET | Refills: 1 | Status: SHIPPED | OUTPATIENT
Start: 2024-01-17

## 2024-01-17 NOTE — TELEPHONE ENCOUNTER
Please review.  Protocol failed / No protocol.    Requested Prescriptions   Pending Prescriptions Disp Refills    losartan 100 MG Oral Tab  0     Sig: Take 1 tablet (100 mg total) by mouth daily.       Hypertensive Medications Protocol Failed - 1/16/2024  7:22 PM        Failed - Last BP reading less than 140/90     BP Readings from Last 1 Encounters:   10/31/23 150/72               Failed - CMP or BMP in past 6 months     No results found for this or any previous visit (from the past 4392 hour(s)).            Passed - In person appointment in the past 12 or next 3 months     Recent Outpatient Visits              2 months ago Primary hypertension    [facility] Ezekiel Contreras,     Office Visit    4 months ago Colon cancer screening    [facility] Ezekiel Contreras,     Office Visit    8 months ago BPH associated with nocturia    [facility] Bhanu Blunt MD    Office Visit    10 months ago Routine physical examination    [facility] Ezekiel Contreras,     Office Visit    11 months ago BPH associated with nocturia    [facility] Bhanu Blunt MD    Office Visit          Future Appointments         Provider Department Appt Notes    In 2 days Star Sotomayor DPKRISHNA [facility] big toe *policy informed    In 1 week Geovanny Willams MD [facility] Foamy urine & urinary frequency (policy informed)    In 2 weeks Bhanu Blunt MD [facility] Foamy urine   Urinary frequency    In 3 months Ezekiel Contreras DO [facility] Physical    In 3 months Bhanu Blunt MD [facility] 1 year                Passed - In person appointment or virtual visit in the past 6 months     Recent Outpatient Visits              2 months ago Primary hypertension    [facility] [place] Ezekiel Contreras, DO    Office Visit    4 months ago Colon cancer screening    [facility] [place] Ezekiel Contreras, DO    Office Visit    8 months ago BPH associated with nocturia    [facility] Bhanu Blunt MD    Office Visit    10 months ago Routine physical examination    [facility] [place] Ezekiel Contreras, DO    Office Visit    11 months ago BPH associated with nocturia    [facility] Bhanu Blunt MD    Office Visit          Future Appointments         Provider Department Appt Notes    In 2 days Star Sotomayor, DPKRISHNA [facility] big toe *policy informed    In 1 week Geovanny Willams MD [facility] Foamy urine & urinary frequency (policy informed)    In 2 weeks Bhanu Blunt MD [facility] Foamy urine   Urinary frequency    In 3 months Ezekiel Contreras, DO [facility] [place] Physical    In 3 months Bhanu Blunt MD [facility] 1 year               Passed - EGFRCR or GFRAA > 50     GFR Evaluation  EGFRCR: 60 , resulted on 10/31/2023                Recent Outpatient Visits              2 months ago Primary hypertension    [facility] [place] Ezekiel Contreras, DO    Office Visit    4 months ago Colon cancer screening    [facility] [place] Ezekiel Contreras, DO    Office Visit    8 months ago BPH associated with nocturia    [facility] Bhanu Blunt MD    Office Visit    10 months ago Routine physical examination    [facility] Ezekiel Contreras DO    Office Visit    11 months ago BPH associated with nocturia    [facility] Bhanu Blunt MD    Office Visit             Future Appointments         Provider Department Appt Notes    In 2 days Star Sotomayor, DPM [facility] big toe *policy informed    In 1 week Geovanny Willams MD [facility] Foamy urine & urinary frequency (policy informed)    In 2 weeks Bhanu Blunt MD [facility] Foamy urine   Urinary frequency    In 3 months Ezekiel Contreras DO [facility] Physical    In 3 months Bhanu Blunt MD [facility] [place] 1 year

## 2024-01-18 ENCOUNTER — OFFICE VISIT (OUTPATIENT)
Dept: NEPHROLOGY | Facility: CLINIC | Age: 64
End: 2024-01-18
Payer: COMMERCIAL

## 2024-01-18 VITALS
HEART RATE: 59 BPM | HEIGHT: 75 IN | DIASTOLIC BLOOD PRESSURE: 70 MMHG | SYSTOLIC BLOOD PRESSURE: 140 MMHG | WEIGHT: 207 LBS | BODY MASS INDEX: 25.74 KG/M2

## 2024-01-18 DIAGNOSIS — N18.2 CKD (CHRONIC KIDNEY DISEASE), STAGE II: Primary | ICD-10-CM

## 2024-01-18 DIAGNOSIS — I10 PRIMARY HYPERTENSION: ICD-10-CM

## 2024-01-18 PROCEDURE — 3077F SYST BP >= 140 MM HG: CPT | Performed by: INTERNAL MEDICINE

## 2024-01-18 PROCEDURE — 99205 OFFICE O/P NEW HI 60 MIN: CPT | Performed by: INTERNAL MEDICINE

## 2024-01-18 PROCEDURE — 3008F BODY MASS INDEX DOCD: CPT | Performed by: INTERNAL MEDICINE

## 2024-01-18 PROCEDURE — 3078F DIAST BP <80 MM HG: CPT | Performed by: INTERNAL MEDICINE

## 2024-01-18 NOTE — PATIENT INSTRUCTIONS
Follow up in 8 weeks   Lab tests as ordered prior to next visit  Ultrasound of kidney - call to make an appointment   Increase fluid intake to 70-80 ounces of water

## 2024-01-18 NOTE — PROGRESS NOTES
Consult Requested By: Dr. Ezekiel Locke     Reason for Consult: CKD stage II-III    HPI:     Patient is a 63 yrs old male with pmh of  HTN, BPH, CKD stage II who presented for work up and evaluation of kidney function     Lab test showed BUN/Cr 17/1.33 mg/dl with an eGFR 60 ml/min. Creatinine was 1.3 mg/dl in 2023 and was 1.25 mg/dl in 2022. Serum albumin and calcium in normal range. UA unremarkable. Aic 5.8%    Patient on amlodipine, hydrochlorothiazide, losartan. Monitors blood pressure at home and readings are in 120-140s range.     State noticed bubbles in urine starting September 2024. No burning or odor. No back aches. No muscle aches. 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 Goes to gym daily. No protein supplements or shakes. Takes zinc, magnesium and tumeric and saw [place]. Drinks 80 ounces of water. Noticed little swelling in the legs. Doesn't eat red meat on regular basis - doesn't overeat animal meat       HISTORY:  Past Medical History:   Diagnosis Date    Enlarged prostate     HLD (hyperlipidemia)     HTN (hypertension)       Past Surgical History:   Procedure Laterality Date    REPAIR ACHILLES TENDON,PRIMARY  2007    SPINAL FUSION  05/2022      No family history on file.   Social History:   Social History     Socioeconomic History    Marital status: Single   Tobacco Use    Smoking status: Never    Smokeless tobacco: Never   Vaping Use    Vaping Use: Never used   Substance and Sexual Activity    Alcohol use: Not Currently    Drug use: Not Currently   Other Topics Concern    Caffeine Concern No    Exercise Yes     Comment: Push ups/weights.    Social History Narrative    The patient does not use an assistive device..      The patient does live in a home with stairs.        Medications (Active prior to today's visit):  Current Outpatient Medications   Medication Sig Dispense Refill    losartan 100 MG Oral Tab Take 1 tablet (100 mg total) by mouth daily. 90 tablet 1    hydroCHLOROthiazide  12.5 MG Oral Cap Take 1 capsule (12.5 mg total) by mouth daily. 90 capsule 0    amLODIPine 5 MG Oral Tab Take 1 tablet (5 mg total) by mouth daily. 90 tablet 0    tadalafil 5 MG Oral Tab Take 1 tablet (5 mg total) by mouth daily. 90 tablet 3       Allergies:  No Known Allergies      ROS:     Constitutional:  Negative for decreased activity, fever, irritability and lethargy  ENMT:  Negative for ear drainage, hearing loss and nasal drainage  Eyes:  Negative for eye discharge and vision loss  Cardiovascular:  Negative for chest pain, sobs  Respiratory:  Negative for cough, dyspnea and wheezing  Gastrointestinal:  Negative for abdominal pain, constipation  Genitourinary:  Negative for dysuria and hematuria  Endocrine:  Negative for abnormal sleep patterns, increased activity  Hema/Lymph:  Negative for easy bleeding and easy bruising  Integumentary:  Negative for pruritus and rash  Musculoskeletal:  Negative for bone/joint symptoms  Neurological:  Negative for gait disturbance  Psychiatric:  Negative for inappropriate interaction and psychiatric symptoms      Vitals:    01/18/24 1439   BP: 140/70   Pulse:    Pulse: 59 /min     PHYSICAL EXAM:   Constitutional: appears well hydrated alert and responsive no acute distress noted  Head/Face: normocephalic  Eyes/Vision: normal extraocular motion is intact  Nose/Mouth/Throat:mucous membranes are moist   Neck/Thyroid: neck is supple   Skin/Hair: no unusual rashes present  Back/Spine: no abnormalities noted  Musculoskeletal:  no deformities  Extremities: no edema  Neurological:  Grossly normal       ASSESSMENT/PLAN:     CKD stage II:  BUN/Cr 17/1.33 mg/dl with an eGFR 60 ml/min. Creatinine was 1.3 mg/dl in 2023 and was 1.25 mg/dl in 2022.   Serum albumin and calcium in normal range.   UA unremarkable. Aic 5.8%  Elevated creatinine likely secondary to high muscle mass  Check cystatin C for more accurate measurement of EGFR  Check urine albumin and urine protein  UA  unremarkable  Check renal ultrasound  Increase fluid intake to 70-80 ounces of water    2.hypertension:  On HCTZ, amlodipine and losartan-no need to change current meds  Blood pressure stable    Follow-up in 8 weeks     Orders This Visit:  Orders Placed This Encounter   Procedures    Protein/Creatinine Ratio, Urine Random    Renal Function Panel    Cystatin C, Serum    Microalb/Creat Ratio, Random Urine       Meds This Visit:  Requested Prescriptions      No prescriptions requested or ordered in this encounter       Imaging & Referrals:  US KIDNEY/BLADDER (NGJ=[id number])     1/18/2024  Geovanny Marrero MD      No follow-ups on file.

## 2024-01-19 ENCOUNTER — LAB ENCOUNTER (OUTPATIENT)
Dept: LAB | Facility: HOSPITAL | Age: 64
End: 2024-01-19
Attending: INTERNAL MEDICINE
Payer: COMMERCIAL

## 2024-01-19 ENCOUNTER — OFFICE VISIT (OUTPATIENT)
Dept: PODIATRY CLINIC | Facility: CLINIC | Age: 64
End: 2024-01-19
Payer: COMMERCIAL

## 2024-01-19 DIAGNOSIS — L60.3 ONYCHODYSTROPHY: ICD-10-CM

## 2024-01-19 DIAGNOSIS — N18.2 CKD (CHRONIC KIDNEY DISEASE), STAGE II: ICD-10-CM

## 2024-01-19 DIAGNOSIS — S99.922S INJURY OF TOENAIL OF LEFT FOOT, SEQUELA: Primary | ICD-10-CM

## 2024-01-19 LAB
ALBUMIN SERPL-MCNC: 4.4 G/DL (ref 3.2–4.8)
ANION GAP SERPL CALC-SCNC: 7 MMOL/L (ref 0–18)
BUN BLD-MCNC: 15 MG/DL (ref 9–23)
BUN/CREAT SERPL: 10.8 (ref 10–20)
CALCIUM BLD-MCNC: 9.8 MG/DL (ref 8.7–10.4)
CHLORIDE SERPL-SCNC: 106 MMOL/L (ref 98–112)
CO2 SERPL-SCNC: 29 MMOL/L (ref 21–32)
CREAT BLD-MCNC: 1.39 MG/DL
CREAT UR-SCNC: 63.2 MG/DL
CREAT UR-SCNC: 63.2 MG/DL
EGFRCR SERPLBLD CKD-EPI 2021: 57 ML/MIN/1.73M2 (ref 60–?)
GLUCOSE BLD-MCNC: 87 MG/DL (ref 70–99)
MICROALBUMIN UR-MCNC: <0.3 MG/DL
OSMOLALITY SERPL CALC.SUM OF ELEC: 294 MOSM/KG (ref 275–295)
PHOSPHATE SERPL-MCNC: 3.1 MG/DL (ref 2.4–5.1)
POTASSIUM SERPL-SCNC: 3.9 MMOL/L (ref 3.5–5.1)
PROT UR-MCNC: 6.5 MG/DL (ref ?–14)
PROT/CREAT UR-RTO: 0.1
SODIUM SERPL-SCNC: 142 MMOL/L (ref 136–145)

## 2024-01-19 PROCEDURE — 82043 UR ALBUMIN QUANTITATIVE: CPT | Performed by: INTERNAL MEDICINE

## 2024-01-19 PROCEDURE — 36415 COLL VENOUS BLD VENIPUNCTURE: CPT | Performed by: INTERNAL MEDICINE

## 2024-01-19 PROCEDURE — 82570 ASSAY OF URINE CREATININE: CPT | Performed by: INTERNAL MEDICINE

## 2024-01-19 PROCEDURE — 84156 ASSAY OF PROTEIN URINE: CPT

## 2024-01-19 PROCEDURE — 80069 RENAL FUNCTION PANEL: CPT | Performed by: INTERNAL MEDICINE

## 2024-01-19 PROCEDURE — 99203 OFFICE O/P NEW LOW 30 MIN: CPT | Performed by: PODIATRIST

## 2024-01-19 PROCEDURE — 82610 CYSTATIN C: CPT

## 2024-01-19 PROCEDURE — 82570 ASSAY OF URINE CREATININE: CPT

## 2024-01-19 NOTE — PROGRESS NOTES
[facility] Podiatry  Progress Note    Christiano Hyatt is a 63 year old male.   Chief Complaint   Patient presents with    Toe Pain     Consult - Left big toe - injured his toe long time ago and part of his toenail came off - thee other part seems to be thick - no pain          HPI:     This is a pleasant male with PMH of lumbar radiculopathy.    He presents to clinic today due to left hallux toenail injury which occurred in October of 2023.  He states the noticed that the toenail was loose and he removed it, but states the end of the toenail is still present.  He denies any drainage or pain.        Allergies: Patient has no known allergies.   Current Outpatient Medications   Medication Sig Dispense Refill    losartan 100 MG Oral Tab Take 1 tablet (100 mg total) by mouth daily. 90 tablet 1    hydroCHLOROthiazide 12.5 MG Oral Cap Take 1 capsule (12.5 mg total) by mouth daily. 90 capsule 0    amLODIPine 5 MG Oral Tab Take 1 tablet (5 mg total) by mouth daily. 90 tablet 0    tadalafil 5 MG Oral Tab Take 1 tablet (5 mg total) by mouth daily. 90 tablet 3      Past Medical History:   Diagnosis Date    Enlarged prostate     HLD (hyperlipidemia)     HTN (hypertension)       Past Surgical History:   Procedure Laterality Date    REPAIR ACHILLES TENDON,PRIMARY  2007    SPINAL FUSION  05/2022      History reviewed. No pertinent family history.   Social History     Socioeconomic History    Marital status: Single   Tobacco Use    Smoking status: Never    Smokeless tobacco: Never   Vaping Use    Vaping Use: Never used   Substance and Sexual Activity    Alcohol use: Not Currently    Drug use: Not Currently   Other Topics Concern    Caffeine Concern No    Exercise Yes     Comment: Push ups/weights.            REVIEW OF SYSTEMS:   Denies nausea, fever, chills  No calf pain  No other muscle or joint aches  Denies chest pain or shortness of breath.      EXAM:   There were no vitals taken for this visit.    Constitutional:   Patient in  no apparent distress. Well kept Of normal body habitus. Alert and oriented to person, place, and time.  Integumentary examination:   There are no varicosities.   Skin appears moist, warm, and supple with positive hair growth.     Left hallux toenail anonychia along the proximal 1/2.  The distal 1/2 of the left hallux toenail is present and well adhered to the nail bed.  No drainage and no SOI.      Vascular examination:   DP pulse is 2/4  PT pulse is 2/4  Capillary refill is imediate  Edema is not present bilateral.  Temperature warm proximally to warm distally bilateral.  Neurological examination:   Vibratory (128-Hz tuning fork) sensation is present to right and is present to left.  Sharp/dull is present to right and is present to left.  Musculoskeletal examination:  Muscle Strength is 5/5.  Gait appears normal.      LABS & IMAGING:     Lab Results   Component Value Date    GLU 79 10/31/2023    BUN 17 10/31/2023    CREATSERUM 1.33 (H) 10/31/2023    BUNCREA 12.8 10/31/2023    ANIONGAP 9 10/31/2023    CA 9.5 10/31/2023     10/31/2023    K 3.7 10/31/2023     10/31/2023    CO2 27.0 10/31/2023    OSMOCALC 290 10/31/2023        Lab Results   Component Value Date     09/18/2023    A1C 5.8 (H) 09/18/2023        No results found.     ASSESSMENT AND PLAN:   Diagnoses and all orders for this visit:    Injury of toenail of left foot, sequela    Onychodystrophy        Plan:     Reviewed treatment options for nail dystrophy including:   No treatment and monitoring, topical meds, oral meds, nail removal     I discussed with pt that since he did remove the proximal 1/2 of the left hallux toenail due to injury the remaining distal 1/2 of the toenail is still present.  I explained to him that the distal portion of the toenail will most likely fall off in the next few months.  I also explained to pt that the new toenail does have a higher chance of growing in thick and abnormal due to the injury.      Will hold  off on any nail surgery at this time due to no drainage, no SOI and stable nature of the remaining nail.      RTC 4 months for re evaluation.        No follow-ups on file.    Star Sotomayor DPM  1/19/2024

## 2024-01-22 LAB
CYSTATIN C: 0.78 MG/L
EGFR: 105 ML/MIN/1.73

## 2024-01-25 DIAGNOSIS — I10 PRIMARY HYPERTENSION: ICD-10-CM

## 2024-01-26 ENCOUNTER — HOSPITAL ENCOUNTER (OUTPATIENT)
Dept: ULTRASOUND IMAGING | Facility: HOSPITAL | Age: 64
Discharge: HOME | End: 2024-01-26
Attending: INTERNAL MEDICINE
Payer: COMMERCIAL

## 2024-01-26 DIAGNOSIS — N18.2 CKD (CHRONIC KIDNEY DISEASE), STAGE II: ICD-10-CM

## 2024-01-26 PROCEDURE — 76770 US EXAM ABDO BACK WALL COMP: CPT | Performed by: INTERNAL MEDICINE

## 2024-01-26 RX ORDER — AMLODIPINE BESYLATE 5 MG/1
5 TABLET ORAL DAILY
Qty: 90 TABLET | Refills: 1 | Status: SHIPPED | OUTPATIENT
Start: 2024-01-26

## 2024-01-26 NOTE — TELEPHONE ENCOUNTER
Please review. Protocol failed / No Protocol.    Requested Prescriptions   Pending Prescriptions Disp Refills    amLODIPine 5 MG Oral Tab 90 tablet 0     Sig: Take 1 tablet (5 mg total) by mouth daily.       Hypertensive Medications Protocol Failed - 1/25/2024  9:43 PM        Failed - Last BP reading less than 140/90     BP Readings from Last 1 Encounters:   01/18/24 140/70               Failed - CMP or BMP in past 6 months     No results found for this or any previous visit (from the past 4392 hour(s)).            Passed - In person appointment in the past 12 or next 3 months     Recent Outpatient Visits              1 week ago Injury of toenail of left foot, sequela    [facility] Star Sotomayor DPM    Office Visit    1 week ago CKD (chronic kidney disease), stage II    [facility] Geovanny Willams MD    Office Visit    2 months ago Primary hypertension    [facility] Ezekiel Contreras DO    Office Visit    4 months ago Colon cancer screening    [facility] Ezekiel Contreras DO    Office Visit    8 months ago BPH associated with nocturia    [facility] Bhanu Blunt MD    Office Visit          Future Appointments         Provider Department Appt Notes    Today [address] Ultrasound QA ID-AP [facility]    In 6 days Bhanu Blunt MD [facility] Foamy urine   Urinary frequency    In 2 months Ezekiel Contreras DO [facility] Physical    In 3 months Bhanu Blunt MD [facility] 1 year    In 3 months Star Sotomayor DPM [facility] 4 MOS F/U               Passed - In person appointment or virtual visit in the  past 6 months     Recent Outpatient Visits              1 week ago Injury of toenail of left foot, sequela    [facility] Star Sotomayor DPM    Office Visit    1 week ago CKD (chronic kidney disease), stage II    [facility] Geovanny Willams MD    Office Visit    2 months ago Primary hypertension    [facility] Ezekiel Contreras,     Office Visit    4 months ago Colon cancer screening    [facility] Ezekiel Contreras,     Office Visit    8 months ago BPH associated with nocturia    [facility] Bhanu Blunt MD    Office Visit          Future Appointments         Provider Department Appt Notes    Today [facility] Ultrasound QA ID-AP [facility]    In 6 days Bhanu Blunt MD [facility] Foamy urine   Urinary frequency    In 2 months Ezekiel Contreras DO [facility] Physical    In 3 months Bhanu Blunt MD [facility] 1 year    In 3 months Star Sotomayor DPM [facility] 4 MOS F/U               Passed - EGFRCR or GFRAA > 50     GFR Evaluation  EGFRCR: 57 , resulted on 1/19/2024

## 2024-01-27 DIAGNOSIS — I10 PRIMARY HYPERTENSION: ICD-10-CM

## 2024-01-29 ENCOUNTER — OFFICE VISIT (OUTPATIENT)
Dept: FAMILY MEDICINE CLINIC | Facility: CLINIC | Age: 64
End: 2024-01-29
Payer: COMMERCIAL

## 2024-01-29 ENCOUNTER — LAB ENCOUNTER (OUTPATIENT)
Dept: LAB | Age: 64
End: 2024-01-29
Attending: FAMILY MEDICINE
Payer: COMMERCIAL

## 2024-01-29 VITALS
WEIGHT: 207 LBS | HEART RATE: 62 BPM | BODY MASS INDEX: 25.74 KG/M2 | SYSTOLIC BLOOD PRESSURE: 142 MMHG | DIASTOLIC BLOOD PRESSURE: 80 MMHG | HEIGHT: 75 IN | OXYGEN SATURATION: 98 %

## 2024-01-29 DIAGNOSIS — N40.0 ENLARGED PROSTATE: ICD-10-CM

## 2024-01-29 DIAGNOSIS — N13.30 HYDRONEPHROSIS, UNSPECIFIED HYDRONEPHROSIS TYPE: ICD-10-CM

## 2024-01-29 DIAGNOSIS — R73.03 PREDIABETES: ICD-10-CM

## 2024-01-29 DIAGNOSIS — Z13.21 ENCOUNTER FOR VITAMIN DEFICIENCY SCREENING: ICD-10-CM

## 2024-01-29 DIAGNOSIS — R73.03 PREDIABETES: Primary | ICD-10-CM

## 2024-01-29 LAB
EST. AVERAGE GLUCOSE BLD GHB EST-MCNC: 114 MG/DL (ref 68–126)
HBA1C MFR BLD: 5.6 % (ref ?–5.7)
VIT D+METAB SERPL-MCNC: 22.1 NG/ML (ref 30–100)

## 2024-01-29 PROCEDURE — 83036 HEMOGLOBIN GLYCOSYLATED A1C: CPT

## 2024-01-29 PROCEDURE — 99214 OFFICE O/P EST MOD 30 MIN: CPT | Performed by: FAMILY MEDICINE

## 2024-01-29 PROCEDURE — 36415 COLL VENOUS BLD VENIPUNCTURE: CPT

## 2024-01-29 PROCEDURE — 82306 VITAMIN D 25 HYDROXY: CPT

## 2024-01-29 RX ORDER — HYDROCHLOROTHIAZIDE 12.5 MG/1
12.5 CAPSULE, GELATIN COATED ORAL DAILY
Qty: 90 CAPSULE | Refills: 3 | Status: SHIPPED | OUTPATIENT
Start: 2024-01-29

## 2024-01-29 NOTE — PATIENT INSTRUCTIONS
Restart Amlodipine/Atorvastatin 10 mg / 20 mg orally daily. Continue with Losartan 100 mg orally. Exercise 150 minutes weekly.  Monitor blood pressures and record at home. Limit salt intake.  Encouraged physical fitness and daily physical activity daily.  A1c update and vitamin D 3 assessment.

## 2024-01-29 NOTE — TELEPHONE ENCOUNTER
Please review; protocol failed/No Protocol    No Labs pended yet    Requested Prescriptions   Pending Prescriptions Disp Refills    HYDROCHLOROTHIAZIDE 12.5 MG Oral Cap [Pharmacy Med Name: HYDROCHLOROTHIAZIDE 12.5 MG CP] 90 capsule 0     Sig: TAKE 1 CAPSULE BY MOUTH EVERY DAY       Hypertensive Medications Protocol Failed - 1/27/2024  6:58 AM        Failed - Last BP reading less than 140/90     BP Readings from Last 1 Encounters:   01/18/24 140/70               Failed - CMP or BMP in past 6 months     No results found for this or any previous visit (from the past 4392 hour(s)).            Passed - In person appointment in the past 12 or next 3 months     Recent Outpatient Visits              1 week ago Injury of toenail of left foot, sequela    [facility] Star Sotomayor DPM    Office Visit    1 week ago CKD (chronic kidney disease), stage II    [facility] Geovanny Willams MD    Office Visit    3 months ago Primary hypertension    [facility] Ezekiel Contreras,     Office Visit    4 months ago Colon cancer screening    [facility] Ezekiel Contreras,     Office Visit    9 months ago BPH associated with nocturia    [facility] Bhanu Blunt MD    Office Visit          Future Appointments         Provider Department Appt Notes    Today Ezekiel Contreras,  [facility] Hypertension/kidney/nutrition    In 3 days Bhanu Blunt MD [facility] Foamy urine   Urinary frequency    In 2 months Ezekiel Contreras DO [facility] Physical    In 3 months Bhanu Blunt MD [facility] 1 year    In 3 months Star Sotomayor DPM  [facility] 4 MOS F/U               Passed - In person appointment or virtual visit in the past 6 months     Recent Outpatient Visits              1 week ago Injury of toenail of left foot, sequela    [facility] Star Sotomayor DPKRISHNA    Office Visit    1 week ago CKD (chronic kidney disease), stage II    [facility] [place] Geovanny Willmas MD    Office Visit    3 months ago Primary hypertension    [facility] Ezekiel Contreras,     Office Visit    4 months ago Colon cancer screening    [facility] Ezekiel Contreras,     Office Visit    9 months ago BPH associated with nocturia    [facility] Bhanu Blunt MD    Office Visit          Future Appointments         Provider Department Appt Notes    Today Ezekiel Contreras DO [facility] Hypertension/kidney/nutrition    In 3 days Bhanu Blunt MD [facility] Foamy urine   Urinary frequency    In 2 months Ezekiel Contreras DO [facility] Physical    In 3 months Bhanu Blunt MD [facility] 1 year    In 3 months Star Sotomayor, DPKRISHNA [facility] 4 MOS F/U               Passed - EGFRCR or GFRAA > 50     GFR Evaluation  EGFRCR: 57 , resulted on 1/19/2024             Future Appointments         Provider Department Appt Notes    Today Ezekiel Contreras DO [facility] Hypertension/kidney/nutrition    In 3 days [facility] Fo[facility]   Urinary frequency    In 2 months Ezekiel Contreras DO [facility] Physical    In 3 months Bhanu Blunt MD [facility] 1 year    In 3 months Star Sotomayor DPM [facility] 4 MOS F/U          Recent Outpatient Visits              1 week ago Injury of toenail of left foot, sequela    [facility] Star Sotomayor DPM    Office Visit    1 week ago CKD (chronic kidney disease), stage II    [facility] Geovanny Willams MD    Office Visit    3 months ago Primary hypertension    [facility] Ezekiel Contreras,     Office Visit    4 months ago Colon cancer screening    [facility] Ezekiel Contreras,     Office Visit    9 months ago BPH associated with nocturia    [facility] Bhanu Blunt MD    Office Visit

## 2024-01-30 ENCOUNTER — PATIENT MESSAGE (OUTPATIENT)
Dept: FAMILY MEDICINE CLINIC | Facility: CLINIC | Age: 64
End: 2024-01-30

## 2024-01-30 NOTE — TELEPHONE ENCOUNTER
From: Christiano Hyatt  To: Ezekiel Contreras  Sent: 1/30/2024 9:37 AM CST  Subject: Vitamin D deficiency     Good morning, jus saw the results of the 2 blood test. I use to be on a prescribed vitamin D by Dr. Stock due to the Astorvastatin I'm taking. He said that it caused vitamin D deficiencies. Should I start back on it??

## 2024-01-31 DIAGNOSIS — E55.9 VITAMIN D INSUFFICIENCY: Primary | ICD-10-CM

## 2024-01-31 RX ORDER — ERGOCALCIFEROL 1.25 MG/1
50000 CAPSULE ORAL WEEKLY
Qty: 12 CAPSULE | Refills: 0 | Status: SHIPPED | OUTPATIENT
Start: 2024-01-31 | End: 2024-04-24

## 2024-02-01 ENCOUNTER — OFFICE VISIT (OUTPATIENT)
Dept: SURGERY | Facility: CLINIC | Age: 64
End: 2024-02-01
Payer: COMMERCIAL

## 2024-02-01 DIAGNOSIS — N13.8 BPH WITH OBSTRUCTION/LOWER URINARY TRACT SYMPTOMS: Primary | ICD-10-CM

## 2024-02-01 DIAGNOSIS — N52.9 ERECTILE DYSFUNCTION, UNSPECIFIED ERECTILE DYSFUNCTION TYPE: ICD-10-CM

## 2024-02-01 DIAGNOSIS — N40.1 BPH WITH OBSTRUCTION/LOWER URINARY TRACT SYMPTOMS: Primary | ICD-10-CM

## 2024-02-01 DIAGNOSIS — Z12.5 PROSTATE CANCER SCREENING: ICD-10-CM

## 2024-02-01 LAB
BILIRUBIN: NEGATIVE
GLUCOSE (URINE DIPSTICK): NEGATIVE MG/DL
KETONES (URINE DIPSTICK): NEGATIVE MG/DL
LEUKOCYTES: NEGATIVE
MULTISTIX LOT#: ABNORMAL NUMERIC
NITRITE, URINE: NEGATIVE
PH, URINE: 7 (ref 4.5–8)
PROTEIN (URINE DIPSTICK): NEGATIVE MG/DL
SPECIFIC GRAVITY: 1.01 (ref 1–1.03)
UROBILINOGEN,SEMI-QN: 0.2 MG/DL (ref 0–1.9)

## 2024-02-01 PROCEDURE — 81002 URINALYSIS NONAUTO W/O SCOPE: CPT | Performed by: UROLOGY

## 2024-02-01 PROCEDURE — 99213 OFFICE O/P EST LOW 20 MIN: CPT | Performed by: UROLOGY

## 2024-02-01 RX ORDER — TAMSULOSIN HYDROCHLORIDE 0.4 MG/1
0.4 CAPSULE ORAL
Qty: 90 CAPSULE | Refills: 1 | Status: SHIPPED | OUTPATIENT
Start: 2024-02-01

## 2024-02-01 NOTE — PROGRESS NOTES
[facility] Urology  Follow-Up Visit    HPI: Christiano Hyatt is a 63 year old male presents for a follow up visit. Patient was last seen on 5/4/2023.  Here by himself.    INTERVAL HISTORY: Following up regarding history of BPH with LUTS, erectile dysfunction, and prostate cancer screening.    He was on tadalafil 5 mg daily for management of BPH with ED.  He discontinued the medication and started tamsulosin 0.4 mg daily about a month ago.    He reports some improvement in his symptoms, mainly in his stream and nocturia.    LUTS waxing and waning. IPSS score 16/35 (3/3/2/2/3/0/3) with a quality-of-life score of 3.    Bladder scan revealed a PVR of 57 mL.    UA 10/2023 was negative.  Urine culture was negative.    Screening PSA level from 4/4/23 normal at 2.65 ng/mL.    Has a prescription for tadalafil 10 to 20 mg as needed for ED however has not used it and he is not sexually active at this point.      1. BPH with LUTS  2. Erectile Dysfunction  Longstanding history of BPH and erectile dysfunction. Seen by multiple urologists in the past including Dr. Agudelo who retired, Dr. Silva, Dr. Bunch at [facility], and Dr. Heaton.     Testosterone from 11/2018 was normal. Difficulty obtaining and maintaining erections.     Also reported history of chronic prostatitis.     His IPSS score is 15 with mixed feelings about his symptoms.  Previously on Flomax however he stopped it due to side effects.      He complains of nocturia x1-3, occasional weak stream and intermittency, feeling of incomplete bladder emptying, frequency, urgency.  He denies straining to urinate, gross hematuria, or dysuria.     Currently on Cialis 5 mg daily to address both issues however patient reports taking the medication about once a week.  He reports improvement when he takes the medication.     - 4/2021 initial visit: PVR 75 mL.  Continue Cialis 5 mg daily.    - 6/2022 F/U: On tadalafil 5 mg daily, taking with moderate compliance.  Stable LUTS.  IPSS score  10/35 (2/2/2/1/1/0/2) with a quality-of-life score of 3. PVR of 0 mL.    - 12/2022 F/U: On tadalafil 5 mg daily as well as saw palmetto and other supplements.  Worsening nocturia, up to 5 times at night.  IPSS score 17/35 (4/4/1/1/2/0/5) with a quality-of-life score of 3.  Bladder scan PVR 9 mL.    - 1/2023 F/U: On tadalafil 5 mg daily as well as saw palmetto.   - Uroflow + PVR: Voided 72.5 mL, Qmax 8.4 mL/s, Qave 4.1 mL/s, flow time 17.3 sec, voiding curve shape flattened, PVR 20 mL.  Add Flomax.    - 5/2023 F/U: Did not start Flomax.  Waxing and waning LUTS.  IPSS 11 (1/2/2/1/2/1/2) with quality-of-life score of 3.  PVR 1 mL.  CCM.  Take additional 10 mg of tadalafil as needed for ED.    - 2/2024 F/U: Discontinue tadalafil 5 mg daily.  Started Flomax daily.  Slight improvement in LUTS.  IPSS 16 (3/3/2/2/3/0/3) with a quality-of-life score of 3.  PVR 57 mL.  He would like to continue current management.        3. Prostate Cancer Screening  No family history of prostate cancer. Patient reports history of 2 previous prostate biopsies with negative results (per his recollection around 2012 and 2014).  Reportedly performed for an elevated PSA velocity and an abnormal TIFFANIE.     Patient's screening PSA level from 4/2023 normal at 2.65 ng/mL.        PAST MEDICAL HISTORY: HTN, HLD, ED, BPH.     PAST SURGICAL HISTORY: achilles tendon surgery 2007, spinal fusion 5/2022.     SOCIAL HISTORY: Patient is single and has no children.  He denies smoking or illicit drug use.  He drinks alcohol socially. He is a retired  and works in [employer].     History reviewed. No pertinent family history.     Allergies: Patient has no known allergies.    Reviewed past medical, surgical, family, and social history.  Reviewed med list and allergies.      REVIEW OF SYSTEMS:  Pertinent positives and negatives per HPI. A 10-point ROS was performed and is otherwise negative.       EXAM:  There were no vitals taken for this visit.      Physical Exam  Constitutional:       General: He is not in acute distress.     Appearance: He is well-developed.   HENT:      Head: Normocephalic.   Eyes:      General: No scleral icterus.  Cardiovascular:      Rate and Rhythm: Normal rate.   Pulmonary:      Effort: Pulmonary effort is normal.   Genitourinary:     Comments: TIFFANIE not performed today however on 12/15/2022 revealed a 2+ enlarged prostate, smooth, symmetrical, nontender and nonnodular.  Skin:     General: Skin is warm and dry.   Neurological:      Mental Status: He is alert and oriented to person, place, and time.   Psychiatric:         Mood and Affect: Mood normal.         Behavior: Behavior normal.       PATHOLOGY:  No results found.      LABS:    Component PSA   Latest Ref Rng & Units <=4.00 ng/mL   4/2023 2.65   11/4/2022 3.80   1/13/2022 (outside) 1.60   3/23/2020 (outside) 1.35       IMAGING:  No results found.      IMPRESSION:  63 year old -American male with BPH and LUTS as well as erectile dysfunction.    On medical management with tadalafil 5 mg daily.    Mainly complaining of nocturia. No significant problems during the day. Symptoms are waxing and waning with good and bad days.     Findings reviewed with patient.  He recently started tamsulosin which he reports has improved his LUTS so far.  Discussed continuing current management, maximizing medical therapy with the addition of a 5 alpha reductase inhibitor, or considering minimally invasive surgical treatment options for BPH such as UroLift or TURP.    Benefits, risk, and alternatives of treatment were discussed.    Patient would like to continue current management for the time being.    Discussed erectile dysfunction management.  Different management options discussed including 5 PDE inhibitors, ICI, vacuum device, or penile prosthetic surgery.  Benefits, risk, and alternatives discussed.      Patient will try the tadalafil as needed.  Currently has no partner.    Finally  discussed prostate cancer screening per AUA guidelines.  PSA within normal acceptable range.  Recommend continuing routine prostate cancer screening.  Next PSA 4024.    Patient verbalized understanding.  All questions answered.      PLAN:  1. Continue tamsulosin 0.4 mg daily for BPH with LUTS.  2.  Take tadalafil 10-20 mg as needed for erectile dysfunction.  3. Continue routine prostate cancer screening.     Follow-up in 3 months for an updated IPSS and bladder scan PVR, with a PSA before office visit.      Bhanu Blunt MD  2/1/2024

## 2024-02-03 NOTE — PROGRESS NOTES
Subjective:     Patient ID: Christiano Hyatt is a 63 year old male.    This patient has a 63-year-old hypertensive -American gentleman with a history of mild renal insufficiency followed by nephrology along with a document mild hydronephrosis and enlarged prostate via renal ultrasound with a normal PSA who is here for follow-up regarding these known findings.    Patient is compliant with nephrology appointments and following recommendations.    Patient denies headaches, chest pain, dizziness, shortness of breath, visual changes or any exertional fatigue.    Urine flow is unimpeded.  There is no blood in the urine.    Patient also under the care of of urology.        History/Other:   Review of Systems  Current Outpatient Medications   Medication Sig Dispense Refill    hydroCHLOROthiazide 12.5 MG Oral Cap Take 1 capsule (12.5 mg total) by mouth daily. 90 capsule 3    amLODIPine 5 MG Oral Tab Take 1 tablet (5 mg total) by mouth daily. 90 tablet 1    losartan 100 MG Oral Tab Take 1 tablet (100 mg total) by mouth daily. 90 tablet 1    tamsulosin 0.4 MG Oral Cap Take 1 capsule (0.4 mg total) by mouth daily with dinner. Take 1/2 hour following the same meal each day 90 capsule 1    ergocalciferol 1.25 MG (00146 UT) Oral Cap Take 1 capsule (50,000 Units total) by mouth once a week. 12 capsule 0     Allergies:No Known Allergies    Past Medical History:   Diagnosis Date    Enlarged prostate     HLD (hyperlipidemia)     HTN (hypertension)       Past Surgical History:   Procedure Laterality Date    REPAIR ACHILLES TENDON,PRIMARY  2007    SPINAL FUSION  05/2022      No family history on file.   Social History:   Social History     Socioeconomic History    Marital status: Single   Tobacco Use    Smoking status: Never    Smokeless tobacco: Never   Vaping Use    Vaping Use: Never used   Substance and Sexual Activity    Alcohol use: Not Currently    Drug use: Not Currently   Other Topics Concern    Caffeine Concern No     Exercise Yes     Comment: Push ups/weights.    Social History Narrative    The patient does not use an assistive device..      The patient does live in a home with stairs.        Objective:   Vitals:    01/29/24 1540   BP: 142/80   Pulse:        Physical Exam  Constitutional:       General: He is not in acute distress.     Appearance: Normal appearance. He is not ill-appearing.   Cardiovascular:      Rate and Rhythm: Normal rate and regular rhythm.      Heart sounds:      No gallop.   Pulmonary:      Effort: Pulmonary effort is normal. No respiratory distress.      Breath sounds: Normal breath sounds.   Neurological:      Mental Status: He is alert.   Psychiatric:      Comments: Physician perceived covered/quiet anxiety.         Assessment & Plan:   1. Prediabetes  Status update.  - Hemoglobin A1C [E]; Future    2. Enlarged prostate  Keep urology appointment.    3. Hydronephrosis, unspecified hydronephrosis type  Patient under the care of nephrology.  This is being evaluated from a renal and urological standpoint.  Documented enlarged prostate/BPH diagnosis per urology.    4. Encounter for vitamin deficiency screening  Status update.  - Vitamin D [E]; Future      Orders Placed This Encounter   Procedures    Vitamin D [E]    Hemoglobin A1C [E]       Meds This Visit:  Requested Prescriptions      No prescriptions requested or ordered in this encounter       Imaging & Referrals:  None     Patient Instructions   Restart Amlodipine/Atorvastatin 10 mg / 20 mg orally daily. Continue with Losartan 100 mg orally. Exercise 150 minutes weekly.  Monitor blood pressures and record at home. Limit salt intake.  Encouraged physical fitness and daily physical activity daily.  A1c update and vitamin D 3 assessment.    Return in about 3 months (around 4/29/2024), or if symptoms worsen or fail to improve.

## 2024-02-05 ENCOUNTER — PATIENT MESSAGE (OUTPATIENT)
Dept: NEPHROLOGY | Facility: CLINIC | Age: 64
End: 2024-02-05

## 2024-02-05 NOTE — TELEPHONE ENCOUNTER
Keep yourself hydrated - your lab test showed your kidneys are functioning good.     Will discuss at your upcoming visit

## 2024-02-05 NOTE — TELEPHONE ENCOUNTER
From: Christiano Hyatt  To: Geovanny Marrero  Sent: 2/5/2024 8:24 AM CST  Subject: Kidney/Bladder/CKD 2    Good morning, jus a little concerned about what the next move is for my condition. I've seen my Urologist and primary care physician but I have no direction or knowledge on what I should be doing next. I have no knowledge on how to read the results of the test that you ordered. Please advise me on what's next. Thanks!

## 2024-02-05 NOTE — TELEPHONE ENCOUNTER
Dr[facility] Chu [facility]       Cystatin C showed kidney function at 105 ml/min.  Urine protein unremarkable   Written by Geovanny Marrero MD on 1/23/2024  3:11 PM CST  Seen by patient Christiano Hyatt on 1/28/2024 10:53 PM    Kidney US results in chart.   LOV 1-18-24   RTC: 8 wks with preclinic labs (no labs ordered)

## 2024-02-12 ENCOUNTER — TELEPHONE (OUTPATIENT)
Dept: SURGERY | Facility: CLINIC | Age: 64
End: 2024-02-12

## 2024-02-12 RX ORDER — TADALAFIL 10 MG/1
10 TABLET ORAL
Qty: 20 TABLET | Refills: 1 | Status: SHIPPED | OUTPATIENT
Start: 2024-02-12

## 2024-02-13 NOTE — TELEPHONE ENCOUNTER
Refill per protocol and LOV    Erectile dysfunction medications    Protocol Criteria:  Appointment scheduled in the past 12 months or in the next 2 months  Patient is not on nitrates (Isosorbide, nitroglycerin)    Recent Outpatient Visits              1 week ago BPH with obstruction/lower urinary tract symptoms    [facility] Bhanu Blunt MD    Office Visit    2 weeks ago Prediabetes    [facility] Ezekiel Contreras DO    Office Visit    3 weeks ago Injury of toenail of left foot, sequela    [facility] Star Sotomayor DPM    Office Visit    3 weeks ago CKD (chronic kidney disease), stage II    [facility] Geovanny Willams MD    Office Visit    3 months ago Primary hypertension    [facility] Ezekiel Contreras,     Office Visit          Future Appointments         Provider Department Appt Notes    In 3 weeks Geovanny Willams MD [facility] Plans moving forward to deal with my health issue    In 2 months Ezekiel Contreras DO [facility] Physical    In 2 months Bhanu Blunt MD [facility] 1 year    In 3 months Star Sotomayor DPM [facility] 4 MOS F/U                [unfilled]

## 2024-02-15 ENCOUNTER — OFFICE VISIT (OUTPATIENT)
Dept: NEPHROLOGY | Facility: CLINIC | Age: 64
End: 2024-02-15
Payer: COMMERCIAL

## 2024-02-15 VITALS
DIASTOLIC BLOOD PRESSURE: 64 MMHG | SYSTOLIC BLOOD PRESSURE: 160 MMHG | HEIGHT: 75 IN | BODY MASS INDEX: 25.61 KG/M2 | WEIGHT: 206 LBS | HEART RATE: 64 BPM

## 2024-02-15 DIAGNOSIS — N18.2 CKD (CHRONIC KIDNEY DISEASE), STAGE II: ICD-10-CM

## 2024-02-15 DIAGNOSIS — I10 PRIMARY HYPERTENSION: Primary | ICD-10-CM

## 2024-02-15 PROCEDURE — 99215 OFFICE O/P EST HI 40 MIN: CPT | Performed by: INTERNAL MEDICINE

## 2024-02-15 RX ORDER — AMLODIPINE BESYLATE AND ATORVASTATIN CALCIUM 10; 20 MG/1; MG/1
1 TABLET, FILM COATED ORAL DAILY
COMMUNITY

## 2024-02-15 NOTE — PROGRESS NOTES
Progress Note:     Patient is a 63 yrs old male with pmh of  HTN, BPH, CKD stage II who presented for follow up     Lab test showed BUN/Cr 17/1.33 mg/dl with an eGFR 60 ml/min. Creatinine was 1.3 mg/dl in 2023 and was 1.25 mg/dl in 2022. Serum albumin and calcium in normal range. UA unremarkable. Aic 5.8%    Patient on amlodipine, hydrochlorothiazide, losartan. Monitors blood pressure at home and readings are in 120-140s range.     State noticed bubbles in urine starting September 2024. No burning or odor. No back aches. No muscle aches. 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 Goes to gym daily. No protein supplements or shakes. Takes zinc, magnesium and tumeric and saw Palmetto. Drinks 80 ounces of water. Noticed little swelling in the legs. Doesn't eat red meat on regular basis - doesn't overeat animal meat     State BP high for last one week - no new meds or herbal supplement. No cp or sob.  Blood pressure repeated manually at the office and still elevated      HISTORY:  Past Medical History:   Diagnosis Date    Enlarged prostate     HLD (hyperlipidemia)     HTN (hypertension)       Past Surgical History:   Procedure Laterality Date    REPAIR ACHILLES TENDON,PRIMARY  2007    SPINAL FUSION  05/2022      No family history on file.   Social History:   Social History     Socioeconomic History    Marital status: Single   Tobacco Use    Smoking status: Never    Smokeless tobacco: Never   Vaping Use    Vaping Use: Never used   Substance and Sexual Activity    Alcohol use: Not Currently    Drug use: Not Currently   Other Topics Concern    Caffeine Concern No    Exercise Yes     Comment: Push ups/weights.    Social History Narrative    The patient does not use an assistive device..      The patient does live in a home with stairs.        Medications (Active prior to today's visit):  Current Outpatient Medications   Medication Sig Dispense Refill    amLODIPine-Atorvastatin 10-20 MG Oral Tab Take 1 tablet by mouth  daily.      Tadalafil 10 MG Oral Tab Take 1 tablet (10 mg total) by mouth daily as needed for Erectile Dysfunction. 20 tablet 1    tamsulosin 0.4 MG Oral Cap Take 1 capsule (0.4 mg total) by mouth daily with dinner. Take 1/2 hour following the same meal each day 90 capsule 1    ergocalciferol 1.25 MG (10759 UT) Oral Cap Take 1 capsule (50,000 Units total) by mouth once a week. 12 capsule 0    hydroCHLOROthiazide 12.5 MG Oral Cap Take 1 capsule (12.5 mg total) by mouth daily. 90 capsule 3    losartan 100 MG Oral Tab Take 1 tablet (100 mg total) by mouth daily. 90 tablet 1       Allergies:  No Known Allergies      ROS:     Constitutional:  Negative for decreased activity, fever, irritability and lethargy  ENMT:  Negative for ear drainage, hearing loss and nasal drainage  Eyes:  Negative for eye discharge and vision loss  Cardiovascular:  Negative for chest pain, sobs  Respiratory:  Negative for cough, dyspnea and wheezing  Gastrointestinal:  Negative for abdominal pain, constipation  Genitourinary:  Negative for dysuria and hematuria  Endocrine:  Negative for abnormal sleep patterns, increased activity  Hema/Lymph:  Negative for easy bleeding and easy bruising  Integumentary:  Negative for pruritus and rash  Musculoskeletal:  Negative for bone/joint symptoms  Neurological:  Negative for gait disturbance  Psychiatric:  Negative for inappropriate interaction and psychiatric symptoms      Vitals:    02/15/24 1432   BP: 160/64   Pulse: 64     Wt Readings from Last 6 Encounters:   02/15/24 206 lb (93.4 kg)   01/29/24 207 lb (93.9 kg)   01/18/24 207 lb (93.9 kg)   10/31/23 204 lb (92.5 kg)   09/18/23 203 lb (92.1 kg)   02/27/23 214 lb 4 oz (97.2 kg)         PHYSICAL EXAM:   Constitutional: appears well hydrated alert and responsive no acute distress noted  Head/Face: normocephalic  Eyes/Vision: normal extraocular motion is intact  Nose/Mouth/Throat:mucous membranes are moist   Neck/Thyroid: neck is supple   Skin/Hair: no  unusual rashes present  Back/Spine: no abnormalities noted  Musculoskeletal:  no deformities  Extremities: no edema  Neurological:  Grossly normal       ASSESSMENT/PLAN:     CKD stage I:  BUN/Cr 17/1.33 mg/dl with an eGFR 60 ml/min. Creatinine was 1.3 mg/dl in 2023 - stable  Serum albumin and calcium in normal range.   UA unremarkable. Aic 5.8%- stable  Elevated creatinine likely secondary to high muscle mass  Cystatin C level - 0.78 with an eGFR 105  UPCR and UACR unremarkable  UA unremarkable   renal ultrasound showed enlarged prostate with no hydronephrosis   Increase fluid intake to 70-80 ounces of water  BPH - started on flomax 2 weeks back by Dr. Blunt    2.hypertension:  On HCTZ, amlodipine and losartan  Patient brought home blood pressure reading-uncontrolled for last 1 week.  Counseled about stress and anxiety management  Send home blood pressure readings for 2 weeks    Follow up as needed        Orders This Visit:  No orders of the defined types were placed in this encounter.      Meds This Visit:  Requested Prescriptions      No prescriptions requested or ordered in this encounter       Imaging & Referrals:  None     2/15/2024  Geovanny Marrero MD      No follow-ups on file.

## 2024-03-04 ENCOUNTER — PATIENT MESSAGE (OUTPATIENT)
Dept: NEPHROLOGY | Facility: CLINIC | Age: 64
End: 2024-03-04

## 2024-03-04 NOTE — TELEPHONE ENCOUNTER
Blood pressure fluctuating but mostly in 120s range and few in 130s range.     No changes in the medication

## 2024-03-04 NOTE — TELEPHONE ENCOUNTER
From: Christiano Hyatt  To: Geovanny Marrero  Sent: 3/4/2024 7:30 AM CST  Subject: Blood Pressure Results     Attached are my Blood Pressure results for two weeks that you asked me submit to you.

## 2024-03-25 ENCOUNTER — PATIENT MESSAGE (OUTPATIENT)
Dept: FAMILY MEDICINE CLINIC | Facility: CLINIC | Age: 64
End: 2024-03-25

## 2024-03-25 DIAGNOSIS — R79.89 ELEVATED SERUM CREATININE: ICD-10-CM

## 2024-03-25 DIAGNOSIS — R82.998 FOAMY URINE: Primary | ICD-10-CM

## 2024-03-25 DIAGNOSIS — R35.0 URINARY FREQUENCY: ICD-10-CM

## 2024-03-25 NOTE — TELEPHONE ENCOUNTER
From: Christiano Hyatt  To: Ezekiel Contreras  Sent: 3/25/2024 9:01 AM CDT  Subject: Nephrology     Good morning Dr. Contreras! After my last visit with Dr. Chu Marrero she indicated that I did not have stage ii or iii kidney disease after originally stating that I did. I still have the same symptoms of dehydration, frequent urination and bubbles in my urine. I've done what she requested an have been drinking 80oz of water per day to stay hydrated but the results are still the same. Pura changed my diet an my blood pressure reading are great(kept a journal). I am requesting you to write a referral to see another Nephrologist Dr. Louie Manrique at [facility] to receive a second opinion. They provided me with a fax number to get the process started. ATTN: Dr. Manrique (Fax) [phone].   Patient notified of test results and of Dr. Flowers  recommendations. Patient verbalized understanding that   Celiac disease is well controlled   Cont gluten free diet

## 2024-03-27 NOTE — TELEPHONE ENCOUNTER
Referral has been signed.  My concern is whether this nephrologist is in the network.  This needs to be addressed/established, so that the patient will not be upset if this visit is not covered.  Thank you.  Please fax and forward to all parties.  Thank you.

## 2024-04-02 ENCOUNTER — LAB ENCOUNTER (OUTPATIENT)
Dept: LAB | Facility: HOSPITAL | Age: 64
End: 2024-04-02
Attending: UROLOGY
Payer: COMMERCIAL

## 2024-04-02 DIAGNOSIS — Z12.5 PROSTATE CANCER SCREENING: ICD-10-CM

## 2024-04-02 LAB — COMPLEXED PSA SERPL-MCNC: 1.98 NG/ML (ref ?–4)

## 2024-04-02 PROCEDURE — 36415 COLL VENOUS BLD VENIPUNCTURE: CPT

## 2024-04-08 ENCOUNTER — OFFICE VISIT (OUTPATIENT)
Dept: SURGERY | Facility: CLINIC | Age: 64
End: 2024-04-08
Payer: COMMERCIAL

## 2024-04-08 DIAGNOSIS — Z12.5 PROSTATE CANCER SCREENING: Primary | ICD-10-CM

## 2024-04-08 DIAGNOSIS — N52.9 ERECTILE DYSFUNCTION, UNSPECIFIED ERECTILE DYSFUNCTION TYPE: ICD-10-CM

## 2024-04-08 DIAGNOSIS — N13.8 BPH WITH OBSTRUCTION/LOWER URINARY TRACT SYMPTOMS: ICD-10-CM

## 2024-04-08 DIAGNOSIS — N40.1 BPH WITH OBSTRUCTION/LOWER URINARY TRACT SYMPTOMS: ICD-10-CM

## 2024-04-08 RX ORDER — TAMSULOSIN HYDROCHLORIDE 0.4 MG/1
0.4 CAPSULE ORAL
Qty: 90 CAPSULE | Refills: 3 | Status: SHIPPED | OUTPATIENT
Start: 2024-04-08

## 2024-04-08 NOTE — PROGRESS NOTES
[facility]  Follow-Up Visit    HPI: Christiano Hyatt is a 63 year old male presents for a follow up visit. Patient was last seen on 2/1/24.  Here by himself.    INTERVAL HISTORY: Following up regarding history of BPH with LUTS, erectile dysfunction, and prostate cancer screening.    He is on tamsulosin 0.4 mg daily for his BPH with LUTS.    He reports improvement in his urinary stream.  Generally waxing and waning LUTS.  His IPSS is 14 (2/4/1/2/1/0/4) with a quality-of-life score of 3.    Bladder scan PVR 1 mL.    UA 10/2023 was negative.  Urine culture was negative.    Screening PSA level from 4/2024 normal at 1.98 ng/mL.    Has a prescription for tadalafil 10 to 20 mg as needed for ED. Reports adequate results.      1. BPH with LUTS  2. Erectile Dysfunction  Longstanding history of BPH and erectile dysfunction. Seen by multiple urologists in the past including Dr. Agudelo who retired, Dr. Silva, Dr. Bunch at [facility], and Dr. Heaton.     Testosterone from 11/2018 was normal. Difficulty obtaining and maintaining erections.     Also reported history of chronic prostatitis.     His IPSS score is 15 with mixed feelings about his symptoms.  Previously on Flomax however he stopped it due to side effects.      He complains of nocturia x1-3, occasional weak stream and intermittency, feeling of incomplete bladder emptying, frequency, urgency.  He denies straining to urinate, gross hematuria, or dysuria.     Currently on Cialis 5 mg daily to address both issues however patient reports taking the medication about once a week.  He reports improvement when he takes the medication.     - 4/2021 initial visit: PVR 75 mL.  Continue Cialis 5 mg daily.    - 6/2022 F/U: On tadalafil 5 mg daily, taking with moderate compliance.  Stable LUTS.  IPSS score 10/35 (2/2/2/1/1/0/2) with a quality-of-life score of 3. PVR of 0 mL.    - 12/2022 F/U: On tadalafil 5 mg daily as well as saw palmetto and other supplements.  Worsening nocturia,  up to 5 times at night.  IPSS score 17/35 (4/4/1/1/2/0/5) with a quality-of-life score of 3.  Bladder scan PVR 9 mL.    - 1/2023 F/U: On tadalafil 5 mg daily as well as saw palmetto.   - Uroflow + PVR: Voided 72.5 mL, Qmax 8.4 mL/s, Qave 4.1 mL/s, flow time 17.3 sec, voiding curve shape flattened, PVR 20 mL.  Add Flomax.    - 5/2023 F/U: Did not start Flomax.  Waxing and waning LUTS.  IPSS 11 (1/2/2/1/2/1/2) with quality-of-life score of 3.  PVR 1 mL.  CCM.  Take additional 10 mg of tadalafil as needed for ED.    - 2/2024 F/U: Discontinue tadalafil 5 mg daily.  Started Flomax daily.  Slight improvement in LUTS.  IPSS 16 (3/3/2/2/3/0/3) with a quality-of-life score of 3.  PVR 57 mL.  He would like to continue current management.        3. Prostate Cancer Screening  No family history of prostate cancer. Patient reports history of 2 previous prostate biopsies with negative results (per his recollection around 2012 and 2014).  Reportedly performed for an elevated PSA velocity and an abnormal TIFFANIE.     Patient's screening PSA level from 4/2/2024 normal at 1.98 ng/mL.        PAST MEDICAL HISTORY: HTN, HLD, ED, BPH.     PAST SURGICAL HISTORY: Achilles tendon surgery 2007, spinal fusion 5/2022.     SOCIAL HISTORY: Patient is single and has no children.  He denies smoking or illicit drug use. He drinks alcohol socially. He is a retired  and works in [employer].     History reviewed. No pertinent family history.     Allergies: Patient has no known allergies.    Reviewed past medical, surgical, family, and social history.  Reviewed med list and allergies.      REVIEW OF SYSTEMS:  Pertinent positives and negatives per HPI. A 10-point ROS was performed and is otherwise negative.       EXAM:  There were no vitals taken for this visit.     Physical Exam  Constitutional:       General: He is not in acute distress.     Appearance: He is well-developed.   HENT:      Head: Normocephalic.   Eyes:      General: No scleral  icterus.  Cardiovascular:      Rate and Rhythm: Normal rate.   Pulmonary:      Effort: Pulmonary effort is normal.   Genitourinary:     Comments: TIFFANIE not performed today however on 12/15/2022 revealed a 2+ enlarged prostate, smooth, symmetrical, nontender and nonnodular.  Skin:     General: Skin is warm and dry.   Neurological:      Mental Status: He is alert and oriented to person, place, and time.   Psychiatric:         Mood and Affect: Mood normal.         Behavior: Behavior normal.       PATHOLOGY:  No results found.      LABS:    Component PSA   Latest Ref Rng & Units <=4.00 ng/mL   4/2/2024 1.98   4/2023 2.65   11/4/2022 3.80   1/13/2022 (outside) 1.60   3/23/2020 (outside) 1.35       IMAGING:  No results found.      IMPRESSION:  63 year old -American male with BPH and LUTS as well as erectile dysfunction.    On medical management with tamsulosin 0.4 mg daily.    Symptoms are waxing and waning.  He reports improvement with tamsulosin.  He is not complaining about his stream at this point.    Findings reviewed with patient. Discussed continuing current management, maximizing medical therapy with the addition of a 5 alpha reductase inhibitor, or considering minimally invasive surgical treatment options for BPH such as UroLift or TURP.    Benefits, risk, and alternatives of treatment were discussed.    Patient would like to continue current management for the time being.    Discussed erectile dysfunction management.  Different management options discussed including 5 PDE inhibitors, ICI, vacuum device, or penile prosthetic surgery.  Benefits, risk, and alternatives discussed.      Patient will try the tadalafil as needed.  Currently has no partner.    Finally discussed prostate cancer screening per AUA guidelines.  PSA within normal acceptable range.  Recommend continuing routine prostate cancer screening.  Next PSA 4/2025.    Patient verbalized understanding.  All questions answered.      PLAN:  1.  Continue tamsulosin 0.4 mg daily for BPH with LUTS.  2. Take tadalafil 10-20 mg as needed for erectile dysfunction.  3. Continue routine prostate cancer screening.     Follow-up in 12 months for an updated IPSS and bladder scan PVR, with a PSA before office visit.      Bhanu Blunt MD  4/8/2024

## 2024-04-16 RX ORDER — LOSARTAN POTASSIUM 100 MG/1
100 TABLET ORAL DAILY
Qty: 90 TABLET | Refills: 1 | OUTPATIENT
Start: 2024-04-16

## 2024-04-18 ENCOUNTER — PATIENT MESSAGE (OUTPATIENT)
Dept: FAMILY MEDICINE CLINIC | Facility: CLINIC | Age: 64
End: 2024-04-18

## 2024-04-18 ENCOUNTER — LAB ENCOUNTER (OUTPATIENT)
Dept: LAB | Age: 64
End: 2024-04-18
Attending: FAMILY MEDICINE
Payer: COMMERCIAL

## 2024-04-18 ENCOUNTER — OFFICE VISIT (OUTPATIENT)
Dept: FAMILY MEDICINE CLINIC | Facility: CLINIC | Age: 64
End: 2024-04-18
Payer: COMMERCIAL

## 2024-04-18 VITALS
OXYGEN SATURATION: 100 % | SYSTOLIC BLOOD PRESSURE: 138 MMHG | TEMPERATURE: 98 F | BODY MASS INDEX: 25.13 KG/M2 | WEIGHT: 200 LBS | RESPIRATION RATE: 18 BRPM | HEIGHT: 74.8 IN | HEART RATE: 83 BPM | DIASTOLIC BLOOD PRESSURE: 74 MMHG

## 2024-04-18 DIAGNOSIS — Z28.21 VARICELLA ZOSTER VIRUS (VZV) VACCINATION DECLINED: Primary | ICD-10-CM

## 2024-04-18 DIAGNOSIS — E55.9 VITAMIN D INSUFFICIENCY: ICD-10-CM

## 2024-04-18 DIAGNOSIS — Z00.00 ROUTINE PHYSICAL EXAMINATION: ICD-10-CM

## 2024-04-18 DIAGNOSIS — I10 PRIMARY HYPERTENSION: ICD-10-CM

## 2024-04-18 DIAGNOSIS — R82.90 URINE FINDINGS ABNORMAL: ICD-10-CM

## 2024-04-18 LAB
BILIRUB UR QL: NEGATIVE
CHOLEST SERPL-MCNC: 136 MG/DL (ref ?–200)
CLARITY UR: CLEAR
COLOR UR: COLORLESS
FASTING PATIENT LIPID ANSWER: YES
GLUCOSE UR-MCNC: NORMAL MG/DL
HDLC SERPL-MCNC: 72 MG/DL (ref 40–59)
HGB UR QL STRIP.AUTO: NEGATIVE
KETONES UR-MCNC: NEGATIVE MG/DL
LDLC SERPL CALC-MCNC: 55 MG/DL (ref ?–100)
LEUKOCYTE ESTERASE UR QL STRIP.AUTO: NEGATIVE
NITRITE UR QL STRIP.AUTO: NEGATIVE
NONHDLC SERPL-MCNC: 64 MG/DL (ref ?–130)
PH UR: 7 [PH] (ref 5–8)
PROT UR-MCNC: NEGATIVE MG/DL
SP GR UR STRIP: 1.01 (ref 1–1.03)
TRIGL SERPL-MCNC: 34 MG/DL (ref 30–149)
UROBILINOGEN UR STRIP-ACNC: NORMAL
VLDLC SERPL CALC-MCNC: 5 MG/DL (ref 0–30)

## 2024-04-18 PROCEDURE — 80061 LIPID PANEL: CPT

## 2024-04-18 PROCEDURE — 96127 BRIEF EMOTIONAL/BEHAV ASSMT: CPT | Performed by: FAMILY MEDICINE

## 2024-04-18 PROCEDURE — 99396 PREV VISIT EST AGE 40-64: CPT | Performed by: FAMILY MEDICINE

## 2024-04-18 PROCEDURE — 83516 IMMUNOASSAY NONANTIBODY: CPT

## 2024-04-18 PROCEDURE — 36415 COLL VENOUS BLD VENIPUNCTURE: CPT

## 2024-04-18 PROCEDURE — 86037 ANCA TITER EACH ANTIBODY: CPT

## 2024-04-18 PROCEDURE — 81003 URINALYSIS AUTO W/O SCOPE: CPT

## 2024-04-18 NOTE — PROGRESS NOTES
Subjective:     Patient ID: Christiano Hyatt is a 63 year old male.    This patient is a 63-year-old hypertensive/renal insufficient with variable filtration rates and also being seen by urology and on tamsulosin with regard to prostate issues -American male here for complete preventive care physical and for status update on any confirmed chronic medical illnesses and follow up on any previous labs or procedures that were suggestive or in need of further work up. Colonoscopy is current. Bowel and bladder functions are intact.    Patient continues to express his concerns regarding bubbles in his urine and symptoms that are consistent with dehydration.    Patient is diligent with taking his medication and measuring his blood pressures at home.  Typically he does not have systolic measures greater than the 130 range.    Patient denies headaches, chest pain, dizziness, shortness of breath, visual changes, and/or exertional fatigue.    Patient's recent labs have been reviewed and the latest GFR is 67 with a normal creatinine.    Patient has taken upon himself to set up to see a renal specialist at [facility] for second opinion regarding his renal status and variation in fluctuation in his renal status.    Zoster vaccine is available to the patient.          History/Other:   Review of Systems  Current Outpatient Medications   Medication Sig Dispense Refill    tamsulosin 0.4 MG Oral Cap Take 1 capsule (0.4 mg total) by mouth daily with dinner. Take 1/2 hour following the same meal each day 90 capsule 3    amLODIPine-Atorvastatin 10-20 MG Oral Tab Take 1 tablet by mouth daily.      Tadalafil 10 MG Oral Tab Take 1 tablet (10 mg total) by mouth daily as needed for Erectile Dysfunction. 20 tablet 1    ergocalciferol 1.25 MG (18054 UT) Oral Cap Take 1 capsule (50,000 Units total) by mouth once a week. 12 capsule 0    losartan 100 MG Oral Tab Take 1 tablet (100 mg total) by mouth daily. 90 tablet 1     hydroCHLOROthiazide 12.5 MG Oral Cap Take 1 capsule (12.5 mg total) by mouth daily. 90 capsule 3     Allergies:No Known Allergies    Past Medical History:    Enlarged prostate    HLD (hyperlipidemia)    HTN (hypertension)      Past Surgical History:   Procedure Laterality Date    Repair achilles tendon,primary  2007    Spinal fusion  05/2022      No family history on file.   Social History:   Social History     Socioeconomic History    Marital status: Single   Tobacco Use    Smoking status: Never    Smokeless tobacco: Never   Vaping Use    Vaping status: Never Used   Substance and Sexual Activity    Alcohol use: Not Currently    Drug use: Not Currently   Other Topics Concern    Caffeine Concern No    Exercise Yes     Comment: Push ups/weights.    Social History Narrative    The patient does not use an assistive device..      The patient does live in a home with stairs.        Objective:   Vitals:    04/18/24 0820   BP: 138/74   Pulse: 83   Resp: 18   Temp: 98.2 °F (36.8 °C)       Physical Exam  Constitutional:       Appearance: Normal appearance.   HENT:      Head: Normocephalic.      Right Ear: Tympanic membrane normal.      Left Ear: Tympanic membrane normal.      Nose: Nose normal.      Mouth/Throat:      Mouth: Mucous membranes are moist.   Neck:      Thyroid: No thyromegaly.   Cardiovascular:      Rate and Rhythm: Normal rate and regular rhythm.      Heart sounds:      No gallop.   Pulmonary:      Effort: Pulmonary effort is normal.      Breath sounds: Normal breath sounds.   Neurological:      Mental Status: He is alert and oriented to person, place, and time.         Assessment & Plan:   1. Routine physical examination  General well exam and the following labs have been ordered as they have not been addressed per the patient's chart.  - Urinalysis, Routine [E]; Future  - Lipid Panel [E]; Future    2. Primary hypertension  Blood pressure measures to goal when measured manually by physician.    3. Varicella  zoster virus (VZV) vaccination declined  Declined for now.  - Zoster Recombinant Adjuvanted (Shingrix -Shingles) [92288]    4. Urine findings abnormal  Ordered.  - ANCA Panel Vasculitis; Future      Orders Placed This Encounter   Procedures    Urinalysis, Routine [E]    Lipid Panel [E]    ANCA Panel Vasculitis    Zoster Recombinant Adjuvanted (Shingrix -Shingles) [78213]       Meds This Visit:  Requested Prescriptions      No prescriptions requested or ordered in this encounter       Imaging & Referrals:  ZOSTER VACC RECOMBINANT IM NJX     Patient Instructions   All adult screening ordered and done appropriate for patient's age and gender and risk factors and complaints.  Encouraged physical fitness and daily physical activity daily.  Monitor blood pressures and record at home. Limit salt intake.  Medication reviewed and renewed where needed and appropriate.  Comply with medications.    Return in about 1 year (around 4/18/2025), or if symptoms worsen or fail to improve.

## 2024-04-18 NOTE — TELEPHONE ENCOUNTER
From: Christiano Hyatt  To: Ezekiel Contreras  Sent: 4/18/2024 10:58 AM CDT  Subject: Shingles Vaccination     After reading the After visit notes, I needed to inform you that I have already taken the shingles Vaccination at [facility] late last year and the second dose in January. [facility] in [facility].    Thanks

## 2024-04-18 NOTE — PATIENT INSTRUCTIONS
All adult screening ordered and done appropriate for patient's age and gender and risk factors and complaints.  Encouraged physical fitness and daily physical activity daily.  Monitor blood pressures and record at home. Limit salt intake.  Medication reviewed and renewed where needed and appropriate.  Comply with medications.

## 2024-04-19 LAB
ANTI-MPO ANTIBODIES: <0.2 UNITS
ANTI-PR3 ANTIBODIES: <0.2 UNITS

## 2024-04-19 NOTE — TELEPHONE ENCOUNTER
Please review. Protocol Failed; No Protocol    Ref Range & Units 1/29/24  3:57 PM   Vitamin D, 25OH, Total  30.0 - 100.0 ng/mL 22.1 Low      Requested Prescriptions   Pending Prescriptions Disp Refills    ERGOCALCIFEROL 1.25 MG (54609 UT) Oral Cap [Pharmacy Med Name: VITAMIN D2 1.25MG(50,000 UNIT)] 12 capsule 0     Sig: TAKE 1 CAPSULE BY MOUTH ONE TIME PER WEEK       There is no refill protocol information for this order            Future Appointments         Provider Department Appt Notes    In 11 months Bhanu Blunt MD [facility] 1 YEAR          Recent Outpatient Visits              Yesterday Varicella zoster virus (VZV) vaccination declined    [facility] Ezekiel Contreras DO    Office Visit    1 week ago Prostate cancer screening    [facility] Bhanu Blunt MD    Office Visit    2 months ago Primary hypertension    [facility] Geovanny Willams MD    Office Visit    2 months ago BPH with obstruction/lower urinary tract symptoms    [facility] Bhanu [facility]    Office Visit    2 months ago Prediabetes    [facility] Ezekiel Contreras DO    Office Visit

## 2024-04-20 RX ORDER — ERGOCALCIFEROL 1.25 MG/1
50000 CAPSULE ORAL WEEKLY
Qty: 12 CAPSULE | Refills: 0 | Status: SHIPPED | OUTPATIENT
Start: 2024-04-20

## 2024-05-28 RX ORDER — AMLODIPINE BESYLATE AND ATORVASTATIN CALCIUM 10; 20 MG/1; MG/1
1 TABLET, FILM COATED ORAL DAILY
Qty: 90 TABLET | Refills: 1 | OUTPATIENT
Start: 2024-05-28

## 2024-05-28 RX ORDER — AMLODIPINE BESYLATE AND ATORVASTATIN CALCIUM 10; 20 MG/1; MG/1
1 TABLET, FILM COATED ORAL DAILY
Qty: 90 TABLET | Refills: 3 | Status: SHIPPED | OUTPATIENT
Start: 2024-05-28

## 2024-05-28 NOTE — TELEPHONE ENCOUNTER
Advised patient that his refill was sent to [facility] in [place]. He verbalized understanding.

## 2024-05-28 NOTE — TELEPHONE ENCOUNTER
Patient called to follow up on below medication refill request.     Patient stated his blood pressure is now high because he is unable to obtain a refill, call transferred to Triage.

## 2024-05-28 NOTE — TELEPHONE ENCOUNTER
Please review. Protocol Failed; No Protocol  Medication is listed as patient reported.     Requested Prescriptions   Pending Prescriptions Disp Refills    amLODIPine-Atorvastatin 10-20 MG Oral Tab  0     Sig: Take 1 tablet by mouth daily.       Hypertensive/Cholesterol Combo Medications Protocol Failed - 5/28/2024  1:26 PM        Failed - ALT in past 12 months        Failed - Last ALT < 80     Lab Results   Component Value Date    ALT 22 04/04/2023             Failed - CMP or BMP in past 12 months        Passed - LDL in past 12 months     Lab Results   Component Value Date    LDL 55 04/18/2024             Passed - In person appointment or virtual visit in the past 6 mos or appointment in next 3 mos     Recent Outpatient Visits              1 month ago Varicella zoster virus (VZV) vaccination declined    [facility] Ezekiel Contreras DO    Office Visit    1 month ago Prostate cancer screening    [facility] Bhanu Blunt MD    Office Visit    3 months ago Primary hypertension    [facility] Geovanny Willams MD    Office Visit    3 months ago BPH with obstruction/lower urinary tract symptoms    [facility] Bhanu Blunt MD    Office Visit    4 months ago Prediabetes    [facility] Ezekiel Contreras DO    Office Visit          Future Appointments         Provider Department Appt Notes    In 10 months Bhanu Blunt MD [facility] 1 YEAR                    Passed - EGFRCR or GFRAA > 50     GFR Evaluation  EGFRCR: 57 , resulted on 1/19/2024                 Future Appointments         Provider Department Appt Notes    In 10 months Bhanu Blunt MD [facility] 1 YEAR          Recent Outpatient Visits              1  month ago Varicella zoster virus (VZV) vaccination declined    [facility] Ezekiel Laurent,     Office Visit    1 month ago Prostate cancer screening    [facility], [facility], Bhanu Baker MD    Office Visit    3 months ago Primary hypertension    [facility], [facility], Geovanny Landeros MD    Office Visit    3 months ago BPH with obstruction/lower urinary tract symptoms    [facility], [facility], Bhanu Baker MD    Office Visit    4 months ago Prediabetes    [facility], [facility] [place] Ezekiel Contreras DO    Office Visit

## 2024-05-31 RX ORDER — AMLODIPINE BESYLATE AND ATORVASTATIN CALCIUM 10; 20 MG/1; MG/1
1 TABLET, FILM COATED ORAL DAILY
Qty: 90 TABLET | Refills: 1 | OUTPATIENT
Start: 2024-05-31

## 2024-07-17 DIAGNOSIS — E55.9 VITAMIN D INSUFFICIENCY: ICD-10-CM

## 2024-07-17 RX ORDER — LOSARTAN POTASSIUM 100 MG/1
100 TABLET ORAL DAILY
Qty: 90 TABLET | Refills: 3 | Status: SHIPPED | OUTPATIENT
Start: 2024-07-17

## 2024-07-17 NOTE — TELEPHONE ENCOUNTER
Refill passed per [facility] protocols.     Contains abnormal data BASIC METABOLIC PANEL (POCT)  Order: [id number]  Component  Ref Range & Units 4/5/24  9:58 AM       Requested Prescriptions   Pending Prescriptions Disp Refills    LOSARTAN 100 MG Oral Tab [Pharmacy Med Name: LOSARTAN POTASSIUM 100 MG TAB] 90 tablet 3     Sig: TAKE 1 TABLET BY MOUTH EVERY DAY       Hypertension Medications Protocol Failed - 7/14/2024  7:28 AM        Failed - CMP or BMP in past 12 months        Passed - Last BP reading less than 140/90     BP Readings from Last 1 Encounters:   04/18/24 138/74               Passed - In person appointment or virtual visit in the past 12 mos or appointment in next 3 mos     Recent Outpatient Visits              3 months ago Varicella zoster virus (VZV) vaccination declined    [facility] Ezekiel Contreras DO    Office Visit    3 months ago Prostate cancer screening    [facility] Bhanu Blunt MD    Office Visit    5 months ago Primary hypertension    [facility] Geovanny Willams MD    Office Visit    5 months ago BPH with obstruction/lower urinary tract symptoms    [facility] Bhanu Blunt MD    Office Visit    5 months ago Prediabetes    [facility] Ezekiel Contreras DO    Office Visit          Future Appointments         Provider Department Appt Notes    In 8 months Bhanu Blunt MD [facility] 1 YEAR                    Passed - EGFRCR or GFRAA > 50     GFR Evaluation  EGFRCR: 57 , resulted on 1/19/2024

## 2024-07-20 RX ORDER — ERGOCALCIFEROL 1.25 MG/1
50000 CAPSULE ORAL WEEKLY
Qty: 12 CAPSULE | Refills: 0 | Status: SHIPPED | OUTPATIENT
Start: 2024-07-20

## 2024-07-20 NOTE — TELEPHONE ENCOUNTER
Please review. Protocol Failed or has No Protocol.    Please advise on refill.    Last lab:  Component  Ref Range & Units 1/29/24  3:57 PM   Vitamin D, 25OH, Total  30.0 - 100.0 ng/mL 22.1 Low        Requested Prescriptions   Pending Prescriptions Disp Refills    ERGOCALCIFEROL 1.25 MG (91645 UT) Oral Cap [Pharmacy Med Name: VITAMIN D2 1.25MG(50,000 UNIT)] 12 capsule 0     Sig: TAKE 1 CAPSULE BY MOUTH ONE TIME PER WEEK       There is no refill protocol information for this order          Recent Outpatient Visits              3 months ago Varicella zoster virus (VZV) vaccination declined    [facility] Ezekiel Contreras DO    Office Visit    3 months ago Prostate cancer screening    [facility] Bhanu Blunt MD    Office Visit    5 months ago Primary hypertension    [facility] Geovanny Willams MD    Office Visit    5 months ago BPH with obstruction/lower urinary tract symptoms    [facility] Bhanu Blunt MD    Office Visit    5 months ago Prediabetes    [facility] Ezekiel Contreras DO    Office Visit            Future Appointments         Provider Department Appt Notes    In 8 months Bhanu Blunt MD [facility] 1 YEAR

## 2024-10-29 DIAGNOSIS — E55.9 VITAMIN D INSUFFICIENCY: ICD-10-CM

## 2024-11-01 RX ORDER — ERGOCALCIFEROL 1.25 MG/1
50000 CAPSULE, LIQUID FILLED ORAL WEEKLY
Qty: 12 CAPSULE | Refills: 0 | Status: SHIPPED | OUTPATIENT
Start: 2024-11-01

## 2024-11-01 NOTE — TELEPHONE ENCOUNTER
Please review. Protocol Failed; No Protocol    Medication(s) to Refill:   Requested Prescriptions     Pending Prescriptions Disp Refills    ergocalciferol 1.25 MG (26144 UT) Oral Cap 12 capsule 0     Sig: Take 1 capsule (50,000 Units total) by mouth once a week.         Reason for Medication Refill being sent to Provider / Reason Protocol Failed:  [x] Non-Protocol Medication        Recent Labs:  Lab Results   Component Value Date    VITD 22.1 (L) 01/29/2024              Requested Prescriptions   Pending Prescriptions Disp Refills    ergocalciferol 1.25 MG (46559 UT) Oral Cap 12 capsule 0     Sig: Take 1 capsule (50,000 Units total) by mouth once a week.       There is no refill protocol information for this order            Future Appointments         Provider Department Appt Notes    In 2 weeks Ezekiel Contreras DO [facility]     In 5 months Bhanu Blunt MD [facility] 1 YEAR          Recent Outpatient Visits              6 months ago Varicella zoster virus (VZV) vaccination declined    [facility] Ezekiel Contreras DO    Office Visit    6 months ago Prostate cancer screening    [facility] Bhanu Blunt MD    Office Visit    8 months ago Primary hypertension    [facility] Geovanny Willams MD    Office Visit    9 months ago BPH with obstruction/lower urinary tract symptoms    [facility] Bhanu Blunt MD    Office Visit    9 months ago Prediabetes    [facility] Ezekiel Contreras DO    Office Visit

## 2024-11-15 ENCOUNTER — LAB ENCOUNTER (OUTPATIENT)
Dept: LAB | Age: 64
End: 2024-11-15
Attending: FAMILY MEDICINE
Payer: COMMERCIAL

## 2024-11-15 ENCOUNTER — OFFICE VISIT (OUTPATIENT)
Dept: FAMILY MEDICINE CLINIC | Facility: CLINIC | Age: 64
End: 2024-11-15
Payer: COMMERCIAL

## 2024-11-15 VITALS
TEMPERATURE: 98 F | SYSTOLIC BLOOD PRESSURE: 160 MMHG | BODY MASS INDEX: 26 KG/M2 | HEART RATE: 59 BPM | DIASTOLIC BLOOD PRESSURE: 64 MMHG | RESPIRATION RATE: 16 BRPM | WEIGHT: 204 LBS

## 2024-11-15 DIAGNOSIS — R09.89 LABILE HYPERTENSION: Primary | ICD-10-CM

## 2024-11-15 DIAGNOSIS — Z28.21 INFLUENZA VACCINATION DECLINED BY PATIENT: Primary | ICD-10-CM

## 2024-11-15 DIAGNOSIS — I10 PRIMARY HYPERTENSION: ICD-10-CM

## 2024-11-15 DIAGNOSIS — R09.89 LABILE HYPERTENSION: ICD-10-CM

## 2024-11-15 PROCEDURE — 83835 ASSAY OF METANEPHRINES: CPT

## 2024-11-15 PROCEDURE — 36415 COLL VENOUS BLD VENIPUNCTURE: CPT

## 2024-11-15 PROCEDURE — 99214 OFFICE O/P EST MOD 30 MIN: CPT | Performed by: FAMILY MEDICINE

## 2024-11-15 PROCEDURE — 82088 ASSAY OF ALDOSTERONE: CPT

## 2024-11-15 NOTE — PATIENT INSTRUCTIONS
Aldosterone level as well as metanephrines are being assessed on today via the blood.  In the meantime patient should continue to take blood pressure medication as prescribed.  Pending the results of today's testing, patient may need to see endocrinology and may need abdominal/pelvic region radiographs.  A1c also checked on today.

## 2024-11-19 LAB — ALDOSTERONE: 6.7 NG/DL

## 2024-11-20 LAB
METANEPHRINE: 52.9 PG/ML
NORMETANEPHRINE: 92 PG/ML

## 2024-11-24 NOTE — PROGRESS NOTES
Subjective:     Patient ID: Christiano Hyatt is a 64 year old male.    This patient is a 64-year-old hypertensive -American gentleman with major fluctuations in his blood pressure measures.  There seems to be a pattern of location dependent measures, i.e. elevated with office visits, but the goal at home.  Patient has been compliant with his blood pressure medication and he is a very conscious consumer of healthy foods and he also has pretty routine workout at the.    Patient denies headaches, chest pain, dizziness, shortness of breath, visual changes, and/or exertional fatigue.    Patient declines influenza vaccine.            History/Other:   Review of Systems  Current Outpatient Medications   Medication Sig Dispense Refill    ergocalciferol 1.25 MG (75222 UT) Oral Cap Take 1 capsule (50,000 Units total) by mouth once a week. 12 capsule 0    losartan 100 MG Oral Tab Take 1 tablet (100 mg total) by mouth daily. 90 tablet 3    amLODIPine-Atorvastatin 10-20 MG Oral Tab Take 1 tablet by mouth daily. 90 tablet 3    tamsulosin 0.4 MG Oral Cap Take 1 capsule (0.4 mg total) by mouth daily with dinner. Take 1/2 hour following the same meal each day 90 capsule 3    Tadalafil 10 MG Oral Tab Take 1 tablet (10 mg total) by mouth daily as needed for Erectile Dysfunction. 20 tablet 1    hydroCHLOROthiazide 12.5 MG Oral Cap Take 1 capsule (12.5 mg total) by mouth daily. (Patient not taking: Reported on 11/15/2024) 90 capsule 3     Allergies:Allergies[1]    Past Medical History:    Enlarged prostate    HLD (hyperlipidemia)    HTN (hypertension)      Past Surgical History:   Procedure Laterality Date    Repair achilles tendon,primary  2007    Spinal fusion  05/2022      No family history on file.   Social History:   Social History     Socioeconomic History    Marital status: Single   Tobacco Use    Smoking status: Never    Smokeless tobacco: Never   Vaping Use    Vaping status: Never Used   Substance and Sexual Activity     Alcohol use: Not Currently    Drug use: Not Currently   Other Topics Concern    Caffeine Concern No    Exercise Yes     Comment: Push ups/weights.    Social History Narrative    The patient does not use an assistive device..      The patient does live in a home with stairs.        Objective:   Vitals:    11/15/24 1024   BP: 160/64   Pulse:    Resp:    Temp:        Physical Exam  Constitutional:       General: He is not in acute distress.     Appearance: Normal appearance. He is not ill-appearing.   HENT:      Head: Normocephalic and atraumatic.      Right Ear: Tympanic membrane normal.      Left Ear: Tympanic membrane normal.      Nose: Nose normal.   Neck:      Thyroid: No thyromegaly.   Cardiovascular:      Rate and Rhythm: Normal rate and regular rhythm.      Heart sounds:      No gallop.   Pulmonary:      Effort: Pulmonary effort is normal.      Breath sounds: Normal breath sounds.   Neurological:      Mental Status: He is alert and oriented to person, place, and time.         Assessment & Plan:   1. Primary hypertension  Blood pressure does not consistently measure to goal.  Patient may legitimately have an anxiety induced blood pressure challenge and in particular a whitecoat syndrome (location dependent blood pressure measure).  See patient instructions.    2. Labile hypertension  Continue with secondary cause for hypertension workup.  - Aldosterone, Serum [E]; Future    3. Influenza vaccination declined by patient  Patient declines seasonal flu vaccine.  - Fluzone trivalent vaccine, PF 0.5mL, 6mo+ (87224)      Orders Placed This Encounter   Procedures    Aldosterone, Serum [E]    Fluzone trivalent vaccine, PF 0.5mL, 6mo+ (37553)       Meds This Visit:  Requested Prescriptions      No prescriptions requested or ordered in this encounter       Imaging & Referrals:  INFLUENZA VACCINE, TRI, PRESERV FREE, 0.5 ML     Patient Instructions   Aldosterone level as well as metanephrines are being assessed on today via  the blood.  In the meantime patient should continue to take blood pressure medication as prescribed.  Pending the results of today's testing, patient may need to see endocrinology and may need abdominal/pelvic region radiographs.  A1c also checked on today.    Return in about 6 weeks (around 12/27/2024), or if symptoms worsen or fail to improve.         [1] No Known Allergies

## 2024-12-03 ENCOUNTER — TELEPHONE (OUTPATIENT)
Dept: SURGERY | Facility: CLINIC | Age: 64
End: 2024-12-03

## 2024-12-03 ENCOUNTER — PATIENT MESSAGE (OUTPATIENT)
Dept: FAMILY MEDICINE CLINIC | Facility: CLINIC | Age: 64
End: 2024-12-03

## 2024-12-03 DIAGNOSIS — R35.0 URINARY FREQUENCY: Primary | ICD-10-CM

## 2024-12-03 NOTE — TELEPHONE ENCOUNTER
I s/w pt and determined that recently he is having more frequent nocturia sometimes 2-7 times. He states he is holding off on fluids in the evening and his bowels are moving regularly and daytime frequency is normal. He also denies dysuria, urgency,  hematuria, cloudy, foul smelling urine or fever and chills. I suggested we have him submit a urine sample at the lab for ua and c&s to r/o a possible UTI and I will place the orders in the system and we will call with results. I asked if he wanted me to ask [facility] about adding finasteride or dutasteride and he stated that he will do a little research about both meds and call back if he decides he would like to add on one of these meds.           Symptoms are waxing and waning.  He reports improvement with tamsulosin.  He is not complaining about his stream at this point.     Findings reviewed with patient. Discussed continuing current management, maximizing medical therapy with the addition of a 5 alpha reductase inhibitor, or considering minimally invasive surgical treatment options for BPH such as UroLift or TURP.    PLAN:  1. Continue tamsulosin 0.4 mg daily for BPH with LUTS.  2. Take tadalafil 10-20 mg as needed for erectile dysfunction.  3. Continue routine prostate cancer screening.      Follow-up in 12 months for an updated IPSS and bladder scan PVR, with a PSA before office visit.        Bhanu Blunt MD  4/8/2024

## 2024-12-27 ENCOUNTER — OFFICE VISIT (OUTPATIENT)
Dept: FAMILY MEDICINE CLINIC | Facility: CLINIC | Age: 64
End: 2024-12-27
Payer: COMMERCIAL

## 2024-12-27 VITALS
SYSTOLIC BLOOD PRESSURE: 130 MMHG | DIASTOLIC BLOOD PRESSURE: 76 MMHG | TEMPERATURE: 98 F | HEIGHT: 74.8 IN | RESPIRATION RATE: 18 BRPM | HEART RATE: 58 BPM | OXYGEN SATURATION: 98 % | BODY MASS INDEX: 25.76 KG/M2 | WEIGHT: 205 LBS

## 2024-12-27 DIAGNOSIS — R82.998 FOAMY URINE: ICD-10-CM

## 2024-12-27 DIAGNOSIS — R35.0 URINARY FREQUENCY: Primary | ICD-10-CM

## 2024-12-27 DIAGNOSIS — I10 PRIMARY HYPERTENSION: ICD-10-CM

## 2024-12-27 DIAGNOSIS — R73.03 PREDIABETES: ICD-10-CM

## 2024-12-27 DIAGNOSIS — R39.15 URINARY URGENCY: ICD-10-CM

## 2024-12-27 LAB
BILIRUB UR QL: NEGATIVE
CLARITY UR: CLEAR
COLOR UR: COLORLESS
GLUCOSE UR-MCNC: NORMAL MG/DL
HEMOGLOBIN A1C: 5.3 % (ref 4.3–5.6)
HGB UR QL STRIP.AUTO: NEGATIVE
KETONES UR-MCNC: NEGATIVE MG/DL
LEUKOCYTE ESTERASE UR QL STRIP.AUTO: NEGATIVE
NITRITE UR QL STRIP.AUTO: NEGATIVE
PH UR: 7.5 [PH] (ref 5–8)
PROT UR-MCNC: NEGATIVE MG/DL
SP GR UR STRIP: 1.01 (ref 1–1.03)
UROBILINOGEN UR STRIP-ACNC: NORMAL

## 2024-12-27 PROCEDURE — 83036 HEMOGLOBIN GLYCOSYLATED A1C: CPT | Performed by: FAMILY MEDICINE

## 2024-12-27 PROCEDURE — 99214 OFFICE O/P EST MOD 30 MIN: CPT | Performed by: FAMILY MEDICINE

## 2024-12-27 NOTE — PATIENT INSTRUCTIONS
Patient has been asked to get a calibrated urine  (urinal). He is to collect and measure each and every time.   Medication reviewed and renewed where needed and appropriate.  Comply with medications.  Monitor blood pressures and record at home. Limit salt intake.  May need medication regarding hypertonicity of the bladder.

## 2024-12-28 NOTE — PROGRESS NOTES
Subjective:     Patient ID: Christiano Hyatt is a 64 year old male.    This patient is a 64-year-old hypertensive -American gentleman who has now been seen by recommended specialists including urology and nephrology for urinary complaints including urinary frequency and foamy urine.  Patient has been placed on tamsulosin for prostatic symptoms, which the patient states that he has to be consistent with in order to have unhindered urinary flow.    He is compliant with his blood pressure medications and at home he consistently measures to goal and has brought his recorded measures and they are consistently to goal.  Patient tends to measure high at the clinic.  After making it a conscious effort, the patient was able to get quiet long enough on today's visit and the blood pressure actually measured very well manually and closer to the measures that he has recorded from home.  I truly believe as a medical professional that his blood pressures are influenced by the atmosphere of this office (whitecoat syndrome).    Unfortunately the patient still has intermittent episodes of nocturia x 9 between the hours of midnight and 3 AM in which she describes a full bladder relief every time.  The patient has been given recommendations to get a urinal in which she is able to measure his output on nights when his urinary frequency reaches this level.  This will allow for us to have a true measure on how much urine he is able to produce and put out during that particular time spent.  We will also likely revisit urology concerning this activity.  It appears that there is a greater urinary bladder component to the patient's symptoms than just his prostate.        History/Other:   Review of Systems  Current Outpatient Medications   Medication Sig Dispense Refill    ergocalciferol 1.25 MG (18192 UT) Oral Cap Take 1 capsule (50,000 Units total) by mouth once a week. 12 capsule 0    losartan 100 MG Oral Tab Take 1 tablet (100 mg  total) by mouth daily. 90 tablet 3    amLODIPine-Atorvastatin 10-20 MG Oral Tab Take 1 tablet by mouth daily. 90 tablet 3    tamsulosin 0.4 MG Oral Cap Take 1 capsule (0.4 mg total) by mouth daily with dinner. Take 1/2 hour following the same meal each day 90 capsule 3    Tadalafil 10 MG Oral Tab Take 1 tablet (10 mg total) by mouth daily as needed for Erectile Dysfunction. (Patient not taking: Reported on 12/27/2024) 20 tablet 1    hydroCHLOROthiazide 12.5 MG Oral Cap Take 1 capsule (12.5 mg total) by mouth daily. (Patient not taking: Reported on 12/27/2024) 90 capsule 3     Allergies:Allergies[1]    Past Medical History:    Enlarged prostate    HLD (hyperlipidemia)    HTN (hypertension)      Past Surgical History:   Procedure Laterality Date    Repair achilles tendon,primary  2007    Spinal fusion  05/2022      History reviewed. No pertinent family history.   Social History:   Social History     Socioeconomic History    Marital status: Single   Tobacco Use    Smoking status: Never    Smokeless tobacco: Never   Vaping Use    Vaping status: Never Used   Substance and Sexual Activity    Alcohol use: Not Currently    Drug use: Not Currently   Other Topics Concern    Caffeine Concern No    Exercise Yes     Comment: Push ups/weights.    Social History Narrative    The patient does not use an assistive device..      The patient does live in a home with stairs.        Objective:   Vitals:    12/27/24 1333   BP: 130/76   Pulse:    Resp:    Temp:        Physical Exam  Constitutional:       General: He is in acute distress.      Appearance: Normal appearance.   Cardiovascular:      Rate and Rhythm: Normal rate and regular rhythm.      Heart sounds:      No gallop.   Pulmonary:      Breath sounds: Normal breath sounds.   Neurological:      Mental Status: He is alert and oriented to person, place, and time.   Psychiatric:         Mood and Affect: Mood is anxious.         Assessment & Plan:   1. Urinary frequency  Agree with  urology recommendation to reassess urine and rule out UTI.  - Urinalysis, Routine  - Urine Culture, Routine    2. Foamy urine  ANCA tests were negative.  Thus far workup for foamy urine has been nonyielding.    3. Urinary urgency  A1c at 5.3.  - POC Glycohemoglobin [42406]    4. Primary hypertension  Measures to goal on today.    5. Prediabetes  5.3 on today.  - POC Glycohemoglobin [29764]      Orders Placed This Encounter   Procedures    POC Glycohemoglobin [23557]       Meds This Visit:  Requested Prescriptions      No prescriptions requested or ordered in this encounter       Imaging & Referrals:  None     Patient Instructions   Patient has been asked to get a calibrated urine  (urinal). He is to collect and measure each and every time.   Medication reviewed and renewed where needed and appropriate.  Comply with medications.  Monitor blood pressures and record at home. Limit salt intake.  May need medication regarding hypertonicity of the bladder.      Return in about 3 months (around 3/27/2025), or if symptoms worsen or fail to improve.         [1] No Known Allergies

## 2025-01-18 DIAGNOSIS — E55.9 VITAMIN D INSUFFICIENCY: ICD-10-CM

## 2025-01-22 RX ORDER — ERGOCALCIFEROL 1.25 MG/1
50000 CAPSULE, LIQUID FILLED ORAL WEEKLY
Qty: 12 CAPSULE | Refills: 0 | Status: SHIPPED | OUTPATIENT
Start: 2025-01-22

## 2025-01-22 NOTE — TELEPHONE ENCOUNTER
Please review; protocol failed/No Protocol    Last Office Visit: 12/27/2024    Last Vitamin D: 01/29/2024  Component  Ref Range & Units 1/29/24  3:57 PM   Vitamin D, 25OH, Total  30.0 - 100.0 ng/mL 22.1 Low      Requested Prescriptions   Pending Prescriptions Disp Refills    ergocalciferol 1.25 MG (52866 UT) Oral Cap 12 capsule 0     Sig: Take 1 capsule (50,000 Units total) by mouth once a week.       There is no refill protocol information for this order        Future Appointments         Provider Department Appt Notes    In 1 month Ezekiel Contreras DO [facility] 6 Wk Follow Up-Per dr AMADOR    In 2 months Bhanu Blunt MD [facility] 1 YEAR          Recent Outpatient Visits              3 weeks ago Urinary frequency    [facility] Ezekiel Contreras,     Office Visit    2 months ago Influenza vaccination declined by patient    [facility] Ezkeiel Contreras,     Office Visit    9 months ago Varicella zoster virus (VZV) vaccination declined    [facility] Ezekiel Contreras,     Office Visit    9 months ago Prostate cancer screening    [facility] Bhanu Blunt MD    Office Visit    11 months ago Primary hypertension    [facility] Geovanny Willams MD    Office Visit

## 2025-02-21 ENCOUNTER — OFFICE VISIT (OUTPATIENT)
Dept: FAMILY MEDICINE CLINIC | Facility: CLINIC | Age: 65
End: 2025-02-21
Payer: COMMERCIAL

## 2025-02-21 ENCOUNTER — LAB ENCOUNTER (OUTPATIENT)
Dept: LAB | Age: 65
End: 2025-02-21
Attending: FAMILY MEDICINE
Payer: COMMERCIAL

## 2025-02-21 VITALS
RESPIRATION RATE: 18 BRPM | HEIGHT: 74.8 IN | HEART RATE: 56 BPM | TEMPERATURE: 98 F | WEIGHT: 210 LBS | SYSTOLIC BLOOD PRESSURE: 132 MMHG | DIASTOLIC BLOOD PRESSURE: 70 MMHG | OXYGEN SATURATION: 98 % | BODY MASS INDEX: 26.39 KG/M2

## 2025-02-21 DIAGNOSIS — Z13.21 ENCOUNTER FOR VITAMIN DEFICIENCY SCREENING: ICD-10-CM

## 2025-02-21 DIAGNOSIS — I10 PRIMARY HYPERTENSION: ICD-10-CM

## 2025-02-21 DIAGNOSIS — I10 WHITE COAT SYNDROME WITH DIAGNOSIS OF HYPERTENSION: ICD-10-CM

## 2025-02-21 DIAGNOSIS — R00.1 SINUS BRADYCARDIA: ICD-10-CM

## 2025-02-21 DIAGNOSIS — Z28.21 PNEUMOCOCCAL VACCINATION DECLINED BY PATIENT: Primary | ICD-10-CM

## 2025-02-21 LAB — VIT D+METAB SERPL-MCNC: 51.2 NG/ML (ref 30–100)

## 2025-02-21 PROCEDURE — 99214 OFFICE O/P EST MOD 30 MIN: CPT | Performed by: FAMILY MEDICINE

## 2025-02-21 PROCEDURE — 36415 COLL VENOUS BLD VENIPUNCTURE: CPT

## 2025-02-21 PROCEDURE — 82306 VITAMIN D 25 HYDROXY: CPT

## 2025-02-21 NOTE — PATIENT INSTRUCTIONS
Keep appointment with urology in lieu of persistent daytime bubbles in the urine.  Medication reviewed and renewed where needed and appropriate.  Comply with medications.  Monitor blood pressures and record at home. Limit salt intake.  Vitamin D3 level to be determined in order to see if we can put the patient on a set maintenance dosage of vitamin D3.

## 2025-02-21 NOTE — PROGRESS NOTES
Subjective:     Patient ID: Christiano Hyatt is a 64 year old male.    This patient is a 64-year-old hypertensive -American gentleman who was doing a follow-up regarding this condition.  He is asymptomatic.  He denies headaches, chest pain, dizziness, shortness of breath, acute visual changes, and/or exertional fatigue.  The patient works out avidly.  Patient has had several blood pressure measures at the clinic that were not to goal, but this does not match the readings that he gets at home.    The patient brought his home monitor with him on today and it did register high, thus confirming that he does have an issue with an office assessment-whitecoat syndrome.    The patient continues to have the quite unusual report of bubbles in his urine which are consistent only during the day.  His voids at night do not have the same quality or bubbles that he notices during the day.  The patient has been seen by 2 different neurologist concerning this issue and was advised to see a nephrologist with which advice he follow-up.    All specialist have not been able to determine the reason for these bubbles.  The patient's renal status is very good.  Aside from seeing these bubbles, there is no associated symptoms.        History/Other:   Review of Systems  Current Outpatient Medications   Medication Sig Dispense Refill    ergocalciferol 1.25 MG (00258 UT) Oral Cap Take 1 capsule (50,000 Units total) by mouth once a week. 12 capsule 0    losartan 100 MG Oral Tab Take 1 tablet (100 mg total) by mouth daily. 90 tablet 3    amLODIPine-Atorvastatin 10-20 MG Oral Tab Take 1 tablet by mouth daily. 90 tablet 3    tamsulosin 0.4 MG Oral Cap Take 1 capsule (0.4 mg total) by mouth daily with dinner. Take 1/2 hour following the same meal each day 90 capsule 3    Tadalafil 10 MG Oral Tab Take 1 tablet (10 mg total) by mouth daily as needed for Erectile Dysfunction. (Patient not taking: Reported on 2/21/2025) 20 tablet 1     hydroCHLOROthiazide 12.5 MG Oral Cap Take 1 capsule (12.5 mg total) by mouth daily. (Patient not taking: Reported on 11/15/2024) 90 capsule 3     Allergies:Allergies[1]    Past Medical History:    Enlarged prostate    HLD (hyperlipidemia)    HTN (hypertension)      Past Surgical History:   Procedure Laterality Date    Repair achilles tendon,primary  2007    Spinal fusion  05/2022      History reviewed. No pertinent family history.   Social History:   Social History     Socioeconomic History    Marital status: Single   Tobacco Use    Smoking status: Never    Smokeless tobacco: Never   Vaping Use    Vaping status: Never Used   Substance and Sexual Activity    Alcohol use: Not Currently    Drug use: Not Currently   Other Topics Concern    Caffeine Concern No    Exercise Yes     Comment: Push ups/weights.    Social History Narrative    The patient does not use an assistive device..      The patient does live in a home with stairs.        Objective:   Vitals:    02/21/25 1304   BP: 132/70   Pulse: 56   Resp: 18   Temp: 97.7 °F (36.5 °C)       Physical Exam  Constitutional:       General: He is not in acute distress.     Appearance: Normal appearance. He is not ill-appearing.   Cardiovascular:      Rate and Rhythm: Normal rate and regular rhythm.      Heart sounds:      No gallop.   Pulmonary:      Effort: Pulmonary effort is normal.      Breath sounds: Normal breath sounds.   Neurological:      Mental Status: He is alert and oriented to person, place, and time.         Assessment & Plan:   1. Primary hypertension  To goal.    2. Sinus bradycardia  Patient is scheduled to see cardiologist.    3. White coat syndrome with diagnosis of hypertension  Confirmed.    4. Pneumococcal vaccination declined by patient  Declined by patient.  - Prevnar 20 (PCV20) [87280]    5. Encounter for vitamin deficiency screening  Status update.  - Vitamin D [E]; Future      Orders Placed This Encounter   Procedures    Vitamin D [E]    Prevnar  20 (PCV20) [96545]       Meds This Visit:  Requested Prescriptions      No prescriptions requested or ordered in this encounter       Imaging & Referrals:  PCV20 VACCINE FOR INTRAMUSCULAR USE     Patient Instructions   Keep appointment with urology in lieu of persistent daytime bubbles in the urine.  Medication reviewed and renewed where needed and appropriate.  Comply with medications.  Monitor blood pressures and record at home. Limit salt intake.  Vitamin D3 level to be determined in order to see if we can put the patient on a set maintenance dosage of vitamin D3.    Return in about 6 months (around 8/21/2025), or if symptoms worsen or fail to improve.         [1] No Known Allergies

## 2025-02-27 RX ORDER — TADALAFIL 10 MG/1
10 TABLET ORAL
Qty: 20 TABLET | Refills: 1 | Status: SHIPPED | OUTPATIENT
Start: 2025-02-27

## 2025-02-27 RX ORDER — TADALAFIL 10 MG/1
10 TABLET ORAL DAILY PRN
Qty: 18 TABLET | Refills: 2 | OUTPATIENT
Start: 2025-02-27

## 2025-02-27 NOTE — TELEPHONE ENCOUNTER
- ptotocol met, refill approved  -PLAN:  1. Continue tamsulosin 0.4 mg daily for BPH with LUTS.  2. Take tadalafil 10-20 mg as needed for erectile dysfunction.  3. Continue routine prostate cancer screening.      Follow-up in 12 months for an updated IPSS and bladder scan PVR, with a PSA before office visit.        Bhanu Blunt MD  4/8/2024    Erectile dysfunction medications    Protocol Criteria:  Appointment scheduled in the past 12 months or in the next 2 months  Patient is not on nitrates (Isosorbide, nitroglycerin)    Recent Outpatient Visits              6 days ago [facility]neum[facility] vaccination declined by patient    [facility] [place]    [place]    2 months ago Urinary frequency    [facility] [place]    [facility] Visit    3 months ago Influenza vaccination declined by patient    [facility] [place]    Office Visit    10 months ago Varicella zoster virus (VZV) vaccination declined    [facility] [place]    Office Visit    10 months ago [facility]          Future Appointments         Provider Department Appt Notes    In 1 month Bhanu Blunt MD [facility] [place] 1 YEAR                [unfilled]

## 2025-03-25 ENCOUNTER — LAB ENCOUNTER (OUTPATIENT)
Dept: LAB | Facility: HOSPITAL | Age: 65
End: 2025-03-25
Attending: UROLOGY
Payer: COMMERCIAL

## 2025-03-25 DIAGNOSIS — Z12.5 PROSTATE CANCER SCREENING: ICD-10-CM

## 2025-03-25 LAB — COMPLEXED PSA SERPL-MCNC: 2.71 NG/ML (ref ?–4)

## 2025-03-25 PROCEDURE — 36415 COLL VENOUS BLD VENIPUNCTURE: CPT

## 2025-04-10 ENCOUNTER — OFFICE VISIT (OUTPATIENT)
Dept: SURGERY | Facility: CLINIC | Age: 65
End: 2025-04-10
Payer: COMMERCIAL

## 2025-04-10 DIAGNOSIS — N13.8 BPH WITH OBSTRUCTION/LOWER URINARY TRACT SYMPTOMS: Primary | ICD-10-CM

## 2025-04-10 DIAGNOSIS — Z12.5 PROSTATE CANCER SCREENING: ICD-10-CM

## 2025-04-10 DIAGNOSIS — N40.1 BPH WITH OBSTRUCTION/LOWER URINARY TRACT SYMPTOMS: Primary | ICD-10-CM

## 2025-04-10 DIAGNOSIS — N52.9 ERECTILE DYSFUNCTION, UNSPECIFIED ERECTILE DYSFUNCTION TYPE: ICD-10-CM

## 2025-04-10 PROCEDURE — 99214 OFFICE O/P EST MOD 30 MIN: CPT | Performed by: UROLOGY

## 2025-04-10 RX ORDER — TAMSULOSIN HYDROCHLORIDE 0.4 MG/1
0.4 CAPSULE ORAL
Qty: 90 CAPSULE | Refills: 3 | Status: SHIPPED | OUTPATIENT
Start: 2025-04-10

## 2025-04-10 NOTE — PROGRESS NOTES
[facility] Urology  Follow-Up Visit    HPI: Christiano Hyatt is a 64 year old male presents for a follow up visit. Patient was last seen on 4/8/24.  Here by himself.    INTERVAL HISTORY: Following up regarding history of BPH with LUTS, erectile dysfunction, and prostate cancer screening.    He is on tamsulosin 0.4 mg daily for his BPH with LUTS.    He reports improvement in his urinary stream.  Generally waxing and waning LUTS.  His IPSS is 20 (2/4/3/2/3/2/4) with a quality-of-life score of 3.    Bladder scan PVR 7 mL.    UA 12/2024 was negative.    Screening PSA level from 3/2025 normal at 2.71 ng/mL.    Has a prescription for tadalafil 10 to 20 mg as needed for ED. Reports adequate results.      1. BPH with LUTS  2. Erectile Dysfunction  Longstanding history of BPH and erectile dysfunction. Seen by multiple urologists in the past including Dr. Agudelo who retired, Dr. Silva, Dr. Bunch at [facility], and Dr. Heaton.     Testosterone from 11/2018 was normal. Difficulty obtaining and maintaining erections.     Also reported history of chronic prostatitis.     His IPSS score is 15 with mixed feelings about his symptoms.  Previously on Flomax however he stopped it due to side effects.      He complains of nocturia x1-3, occasional weak stream and intermittency, feeling of incomplete bladder emptying, frequency, urgency.  He denies straining to urinate, gross hematuria, or dysuria.     Currently on Cialis 5 mg daily to address both issues however patient reports taking the medication about once a week.  He reports improvement when he takes the medication.     - 4/2021 initial visit: PVR 75 mL.  Continue Cialis 5 mg daily.    - 6/2022 F/U: On tadalafil 5 mg daily, taking with moderate compliance.  Stable LUTS.  IPSS score 10/35 (2/2/2/1/1/0/2) with a quality-of-life score of 3. PVR of 0 mL.    - 12/2022 F/U: On tadalafil 5 mg daily as well as saw palmetto and other supplements.  Worsening nocturia, up to 5 times at night.  IPSS  score 17/35 (4/4/1/1/2/0/5) with a quality-of-life score of 3.  Bladder scan PVR 9 mL.    - 1/2023 F/U: On tadalafil 5 mg daily as well as saw palmetto.   - Uroflow + PVR: Voided 72.5 mL, Qmax 8.4 mL/s, Qave 4.1 mL/s, flow time 17.3 sec, voiding curve shape flattened, PVR 20 mL.  Add Flomax.    - 5/2023 F/U: Did not start Flomax.  Waxing and waning LUTS.  IPSS 11 (1/2/2/1/2/1/2) with quality-of-life score of 3.  PVR 1 mL.  CCM.  Take additional 10 mg of tadalafil as needed for ED.    - 2/2024 F/U: Discontinue tadalafil 5 mg daily.  Started Flomax daily.  Slight improvement in LUTS.  IPSS 16 (3/3/2/2/3/0/3) with a quality-of-life score of 3.  PVR 57 mL.  He would like to continue current management.        3. Prostate Cancer Screening  No family history of prostate cancer. Patient reports history of 2 previous prostate biopsies with negative results (per his recollection around 2012 and 2014).  Reportedly performed for an elevated PSA velocity and an abnormal TIFFANIE.        PAST MEDICAL HISTORY: HTN, HLD, ED, BPH.     PAST SURGICAL HISTORY: Achilles tendon surgery 2007, spinal fusion 5/2022.     SOCIAL HISTORY: Patient is single and has no children.  He denies smoking or illicit drug use. He drinks alcohol socially. He is a retired  and works in [employer].     History reviewed. No pertinent family history.     Allergies: Patient has no known allergies.    Reviewed past medical, surgical, family, and social history.  Reviewed med list and allergies.      REVIEW OF SYSTEMS:  Pertinent positives and negatives per HPI. A 10-point ROS was performed and is otherwise negative.       EXAM:  There were no vitals taken for this visit.     Physical Exam  Constitutional:       General: He is not in acute distress.     Appearance: He is well-developed.   HENT:      Head: Normocephalic.   Eyes:      General: No scleral icterus.  Cardiovascular:      Rate and Rhythm: Normal rate.   Pulmonary:      Effort: Pulmonary  effort is normal.   Genitourinary:     Comments: TIFFANIE not performed today however on 12/15/2022 revealed a 2+ enlarged prostate, smooth, symmetrical, nontender and nonnodular.  Skin:     General: Skin is warm and dry.   Neurological:      Mental Status: He is alert and oriented to person, place, and time.   Psychiatric:         Mood and Affect: Mood normal.         Behavior: Behavior normal.       PATHOLOGY:  No results found.      LABS:    Component PSA   Latest Ref Rng & Units <=4.00 ng/mL   3/2025 2.71   4/2/2024 1.98   4/2023 2.65   11/4/2022 3.80   1/13/2022 (outside) 1.60   3/23/2020 (outside) 1.35       IMAGING:  No results found.      IMPRESSION:  64 year old -American male with BPH and LUTS as well as erectile dysfunction.    On medical management with tamsulosin 0.4 mg daily.    Symptoms are waxing and waning.  He reports improvement with tamsulosin.  He is not complaining about his stream at this point.    Findings reviewed with patient. Discussed continuing current management, maximizing medical therapy with the addition of a 5 alpha reductase inhibitor, or considering minimally invasive surgical treatment options for BPH such as UroLift or TURP.    Benefits, risk, and alternatives of treatment were discussed.    Patient would like to continue current management for the time being.    Discussed erectile dysfunction management.  Different management options discussed including 5 PDE inhibitors, ICI, vacuum device, or penile prosthetic surgery.  Benefits, risk, and alternatives discussed.      Patient will try the tadalafil as needed.  Currently has no partner.    Finally discussed prostate cancer screening per AUA guidelines.  PSA within normal acceptable range.  Recommend continuing routine prostate cancer screening.  Next PSA 4/2025.    Patient verbalized understanding.  All questions answered.      PLAN:  1. Continue tamsulosin 0.4 mg daily for BPH with LUTS.  2. Take tadalafil 10-20 mg as needed  for erectile dysfunction.  3. Continue routine prostate cancer screening.     Follow-up in 12 months for an updated IPSS and bladder scan PVR, with a PSA before office visit.      Bhanu Blunt MD  4/10/2025

## 2025-05-22 RX ORDER — AMLODIPINE BESYLATE AND ATORVASTATIN CALCIUM 10; 20 MG/1; MG/1
1 TABLET, FILM COATED ORAL DAILY
Qty: 90 TABLET | Refills: 3 | Status: SHIPPED | OUTPATIENT
Start: 2025-05-22

## 2025-06-16 ENCOUNTER — LAB ENCOUNTER (OUTPATIENT)
Dept: LAB | Age: 65
End: 2025-06-16
Attending: FAMILY MEDICINE
Payer: COMMERCIAL

## 2025-06-16 ENCOUNTER — OFFICE VISIT (OUTPATIENT)
Dept: FAMILY MEDICINE CLINIC | Facility: CLINIC | Age: 65
End: 2025-06-16
Payer: COMMERCIAL

## 2025-06-16 VITALS
HEART RATE: 52 BPM | RESPIRATION RATE: 18 BRPM | OXYGEN SATURATION: 96 % | TEMPERATURE: 98 F | SYSTOLIC BLOOD PRESSURE: 124 MMHG | BODY MASS INDEX: 26.26 KG/M2 | DIASTOLIC BLOOD PRESSURE: 78 MMHG | WEIGHT: 209 LBS | HEIGHT: 74.8 IN

## 2025-06-16 DIAGNOSIS — I10 PRIMARY HYPERTENSION: ICD-10-CM

## 2025-06-16 DIAGNOSIS — E55.9 VITAMIN D INSUFFICIENCY: ICD-10-CM

## 2025-06-16 DIAGNOSIS — R35.0 URINARY FREQUENCY: ICD-10-CM

## 2025-06-16 DIAGNOSIS — Z28.21 PNEUMOCOCCAL VACCINATION DECLINED BY PATIENT: ICD-10-CM

## 2025-06-16 DIAGNOSIS — R82.90 URINE ABNORMALITY: Primary | ICD-10-CM

## 2025-06-16 LAB
BILIRUB UR QL: NEGATIVE
CLARITY UR: CLEAR
EST. AVERAGE GLUCOSE BLD GHB EST-MCNC: 126 MG/DL (ref 68–126)
GLUCOSE UR-MCNC: NORMAL MG/DL
HBA1C MFR BLD: 6 % (ref ?–5.7)
HGB UR QL STRIP.AUTO: NEGATIVE
KETONES UR-MCNC: NEGATIVE MG/DL
LEUKOCYTE ESTERASE UR QL STRIP.AUTO: NEGATIVE
NITRITE UR QL STRIP.AUTO: NEGATIVE
PH UR: 6 [PH] (ref 5–8)
PROT UR-MCNC: NEGATIVE MG/DL
SP GR UR STRIP: 1.02 (ref 1–1.03)
UROBILINOGEN UR STRIP-ACNC: NORMAL
VIT D+METAB SERPL-MCNC: 37 NG/ML (ref 30–100)

## 2025-06-16 PROCEDURE — 83036 HEMOGLOBIN GLYCOSYLATED A1C: CPT

## 2025-06-16 PROCEDURE — 36415 COLL VENOUS BLD VENIPUNCTURE: CPT

## 2025-06-16 PROCEDURE — 82306 VITAMIN D 25 HYDROXY: CPT

## 2025-06-16 PROCEDURE — 81003 URINALYSIS AUTO W/O SCOPE: CPT | Performed by: FAMILY MEDICINE

## 2025-06-16 PROCEDURE — 99214 OFFICE O/P EST MOD 30 MIN: CPT | Performed by: FAMILY MEDICINE

## 2025-06-16 NOTE — PATIENT INSTRUCTIONS
Patient has upcoming appointment with urology @ [facility].  A1c and vitamin D3 levels to be measured on today.  A1c has been done in lieu of the patient's recent 10-day stent of urinary frequency both day and nocturia.  Medication reviewed and renewed where needed and appropriate.  Encouraged physical fitness and daily physical activity daily.  Urine collected to rule out possibility of UTI versus other urine abnormalities.

## 2025-06-18 NOTE — PROGRESS NOTES
Subjective:     Patient ID: Christiano Hyatt is a 65 year old male.    This patient is a 65-year-old hypertensive -American gentleman who presents to the clinic with a continuation of bubbles in his urine.  There is no dysuria.  There is no malodorous urine. There is no blood in the urine.  The only urinary symptom the patient has is that of urinary frequency and in particular at nighttime and each void is a complete void and not just sprinkles.  Patient has been seen by urologist and has been reassured that this is normal for some people, however the patient feels externally that this is not normal.  Patient has sought out a urologist.  He plans to be seen at [facility] by one of the urologist.    Patient currently denies headaches, chest pain, dizziness, shortness of breath, acute visual changes, and/or exertional fatigue.    Patient also being screened for vitamin D3 deficiency.    Patient declines pneumococcal vaccine.        History/Other:   Review of Systems  Current Medications[1]  Allergies:Allergies[2]    Past Medical History[3]   Past Surgical History[4]   Family History[5]   Social History: Short Social Hx on File[6]     Objective:   Vitals:    06/16/25 1446   BP: 124/78   Pulse: 52   Resp: 18   Temp: 98 °F (36.7 °C)       Physical Exam  Constitutional:       General: He is not in acute distress.     Appearance: Normal appearance. He is not ill-appearing.   Neck:      Thyroid: No thyromegaly.   Cardiovascular:      Rate and Rhythm: Normal rate and regular rhythm.      Heart sounds:      No gallop.   Pulmonary:      Effort: Pulmonary effort is normal.      Breath sounds: Normal breath sounds.   Neurological:      General: No focal deficit present.      Mental Status: He is alert and oriented to person, place, and time.         Assessment & Plan:   1. Urine abnormality  Specimen collected to evaluate for any type of abnormalities.  - Urinalysis with Culture Reflex    2. Urinary  frequency  Diabetic screen with A1c.  Urine to be sent for culture if there is any abnormality seen.  - Hemoglobin A1C [E]; Future  - Urinalysis with Culture Reflex    3. Vitamin D insufficiency  Patient being checked for vitamin D3 deficiency.  - Vitamin D [E]; Future    4. Primary hypertension  Well-controlled hypertension on current medical regimen.  Minimize salt.  Compliance with medication emphasized and encouraged.    5. Pneumococcal vaccination declined by patient  Prevnar vaccine declined.  - Prevnar 20 (PCV20) [81233]      Orders Placed This Encounter   Procedures    Hemoglobin A1C [E]    Vitamin D [E]    Urinalysis with Culture Reflex    Prevnar 20 (PCV20) [79080]       Meds This Visit:  Requested Prescriptions      No prescriptions requested or ordered in this encounter       Imaging & Referrals:  PCV20 VACCINE FOR INTRAMUSCULAR USE     Patient Instructions   Patient has upcoming appointment with urology @ [facility].  A1c and vitamin D3 levels to be measured on today.  A1c has been done in lieu of the patient's recent 10-day stent of urinary frequency both day and nocturia.  Medication reviewed and renewed where needed and appropriate.  Encouraged physical fitness and daily physical activity daily.  Urine collected to rule out possibility of UTI versus other urine abnormalities.    Return in about 3 months (around 9/16/2025), or if symptoms worsen or fail to improve.         [1]   Current Outpatient Medications   Medication Sig Dispense Refill    amLODIPine-Atorvastatin 10-20 MG Oral Tab Take 1 tablet by mouth daily. 90 tablet 3    losartan 100 MG Oral Tab Take 1 tablet (100 mg total) by mouth daily. 90 tablet 3    tamsulosin 0.4 MG Oral Cap Take 1 capsule (0.4 mg total) by mouth daily with dinner. Take 1/2 hour following the same meal each day 90 capsule 3    Tadalafil 10 MG Oral Tab Take 1 tablet (10 mg total) by mouth daily as needed for Erectile Dysfunction. 20 tablet 1    ergocalciferol  1.25 MG (69074 UT) Oral Cap Take 1 capsule (50,000 Units total) by mouth once a week. (Patient not taking: Reported on 6/16/2025) 12 capsule 0    hydroCHLOROthiazide 12.5 MG Oral Cap Take 1 capsule (12.5 mg total) by mouth daily. (Patient not taking: Reported on 11/15/2024) 90 capsule 3   [2] No Known Allergies  [3]   Past Medical History:   Enlarged prostate    HLD (hyperlipidemia)    HTN (hypertension)   [4]   Past Surgical History:  Procedure Laterality Date    Repair achilles tendon,primary  2007    Spinal fusion  05/2022   [5] No family history on file.  [6]   Social History  Socioeconomic History    Marital status: Single   Tobacco Use    Smoking status: Never    Smokeless tobacco: Never   Vaping Use    Vaping status: Never Used   Substance and Sexual Activity    Alcohol use: Not Currently    Drug use: Not Currently   Other Topics Concern    Caffeine Concern No    Exercise Yes     Comment: Push ups/weights.    Social History Narrative    The patient does not use an assistive device..      The patient does live in a home with stairs.

## 2025-07-01 RX ORDER — LOSARTAN POTASSIUM 100 MG/1
100 TABLET ORAL DAILY
Qty: 90 TABLET | Refills: 3 | Status: SHIPPED | OUTPATIENT
Start: 2025-07-01

## 2025-07-24 ENCOUNTER — TELEPHONE (OUTPATIENT)
Dept: FAMILY MEDICINE CLINIC | Facility: CLINIC | Age: 65
End: 2025-07-24

## 2025-07-24 NOTE — TELEPHONE ENCOUNTER
Patient saw his test results. He forgot to mention that he has frequent leg cramps, blurred vision, dry mouth, and his urine is colorless/clear at night. He states that his osmolarity was high 2-3 years ago and those are all symptoms from when he researched it. He plans to drop off a copy of that test to the office.

## 2025-07-25 NOTE — TELEPHONE ENCOUNTER
Patient contacted (name and date of birth verified). Provider's results and recommendations reviewed with patient. Patient verbalizes understanding of the information, agrees with plan of care and offers no further questions at this time.

## 2025-07-25 NOTE — TELEPHONE ENCOUNTER
I will review the tests once the patient drops the tests all.  Any and all concerns regarding his osmolality will be better addressed by his kidney specialist at [facility].  This is that specialist area of expertise.